# Patient Record
Sex: FEMALE | Race: WHITE | NOT HISPANIC OR LATINO | URBAN - METROPOLITAN AREA
[De-identification: names, ages, dates, MRNs, and addresses within clinical notes are randomized per-mention and may not be internally consistent; named-entity substitution may affect disease eponyms.]

---

## 2017-02-13 ENCOUNTER — OFFICE VISIT - HEALTHEAST (OUTPATIENT)
Dept: FAMILY MEDICINE | Facility: CLINIC | Age: 22
End: 2017-02-13

## 2017-02-13 DIAGNOSIS — Z71.84 COUNSELING FOR TRAVEL: ICD-10-CM

## 2017-02-13 DIAGNOSIS — Z23 NEED FOR VACCINATION: ICD-10-CM

## 2017-02-13 DIAGNOSIS — G43.109 MIGRAINE WITH AURA AND WITHOUT STATUS MIGRAINOSUS: ICD-10-CM

## 2017-02-13 ASSESSMENT — MIFFLIN-ST. JEOR: SCORE: 1463.94

## 2019-01-11 ENCOUNTER — OFFICE VISIT - HEALTHEAST (OUTPATIENT)
Dept: FAMILY MEDICINE | Facility: CLINIC | Age: 24
End: 2019-01-11

## 2019-01-11 DIAGNOSIS — R00.0 TACHYCARDIA: ICD-10-CM

## 2019-01-11 DIAGNOSIS — N92.6 IRREGULAR MENSTRUAL CYCLE: ICD-10-CM

## 2019-01-11 DIAGNOSIS — E66.3 OVERWEIGHT (BMI 25.0-29.9): ICD-10-CM

## 2019-01-11 LAB
ERYTHROCYTE [DISTWIDTH] IN BLOOD BY AUTOMATED COUNT: 13 % (ref 11–14.5)
HCT VFR BLD AUTO: 37.9 % (ref 35–47)
HGB BLD-MCNC: 12.8 G/DL (ref 12–16)
MCH RBC QN AUTO: 28.9 PG (ref 27–34)
MCHC RBC AUTO-ENTMCNC: 33.7 G/DL (ref 32–36)
MCV RBC AUTO: 86 FL (ref 80–100)
PLATELET # BLD AUTO: 317 THOU/UL (ref 140–440)
PMV BLD AUTO: 6.8 FL (ref 7–10)
PROLACTIN SERPL-MCNC: 15.1 NG/ML (ref 0–20)
RBC # BLD AUTO: 4.43 MILL/UL (ref 3.8–5.4)
TSH SERPL DL<=0.005 MIU/L-ACNC: 2.51 UIU/ML (ref 0.3–5)
WBC: 8.4 THOU/UL (ref 4–11)

## 2019-01-11 ASSESSMENT — MIFFLIN-ST. JEOR: SCORE: 1472.56

## 2019-01-15 ENCOUNTER — COMMUNICATION - HEALTHEAST (OUTPATIENT)
Dept: FAMILY MEDICINE | Facility: CLINIC | Age: 24
End: 2019-01-15

## 2019-01-15 DIAGNOSIS — N92.6 IRREGULAR MENSTRUAL CYCLE: ICD-10-CM

## 2019-06-05 ENCOUNTER — OFFICE VISIT - HEALTHEAST (OUTPATIENT)
Dept: FAMILY MEDICINE | Facility: CLINIC | Age: 24
End: 2019-06-05

## 2019-06-05 DIAGNOSIS — N94.6 DYSMENORRHEA: ICD-10-CM

## 2019-06-05 DIAGNOSIS — R00.2 PALPITATIONS: ICD-10-CM

## 2019-06-05 DIAGNOSIS — R00.0 TACHYCARDIA: ICD-10-CM

## 2019-06-05 DIAGNOSIS — Z11.8 SCREENING FOR CHLAMYDIAL DISEASE: ICD-10-CM

## 2019-06-05 DIAGNOSIS — N89.8 VAGINAL DISCHARGE: ICD-10-CM

## 2019-06-05 DIAGNOSIS — Z12.4 CERVICAL CANCER SCREENING: ICD-10-CM

## 2019-06-05 DIAGNOSIS — E66.3 OVERWEIGHT (BMI 25.0-29.9): ICD-10-CM

## 2019-06-05 DIAGNOSIS — N92.1 MENORRHAGIA WITH IRREGULAR CYCLE: ICD-10-CM

## 2019-06-05 DIAGNOSIS — B37.31 YEAST INFECTION OF THE VAGINA: ICD-10-CM

## 2019-06-05 LAB
CLUE CELLS: ABNORMAL
TRICHOMONAS, WET PREP: ABNORMAL
YEAST, WET PREP: ABNORMAL

## 2019-06-06 ENCOUNTER — COMMUNICATION - HEALTHEAST (OUTPATIENT)
Dept: FAMILY MEDICINE | Facility: CLINIC | Age: 24
End: 2019-06-06

## 2019-06-06 DIAGNOSIS — B37.31 YEAST INFECTION OF THE VAGINA: ICD-10-CM

## 2019-06-06 LAB
C TRACH DNA SPEC QL PROBE+SIG AMP: NEGATIVE
N GONORRHOEA DNA SPEC QL NAA+PROBE: NEGATIVE

## 2019-06-07 ENCOUNTER — COMMUNICATION - HEALTHEAST (OUTPATIENT)
Dept: FAMILY MEDICINE | Facility: CLINIC | Age: 24
End: 2019-06-07

## 2019-06-07 LAB
BKR LAB AP ABNORMAL BLEEDING: NORMAL
BKR LAB AP BIRTH CONTROL/HORMONES: NORMAL
BKR LAB AP CERVICAL APPEARANCE: NORMAL
BKR LAB AP GYN ADEQUACY: NORMAL
BKR LAB AP GYN INTERPRETATION: NORMAL
BKR LAB AP HPV REFLEX: NORMAL
BKR LAB AP LMP: NORMAL
BKR LAB AP PATIENT STATUS: NORMAL
BKR LAB AP PREVIOUS ABNORMAL: NORMAL
BKR LAB AP PREVIOUS NORMAL: NORMAL
HIGH RISK?: NO
PATH REPORT.COMMENTS IMP SPEC: NORMAL
RESULT FLAG (HE HISTORICAL CONVERSION): NORMAL

## 2019-06-14 ENCOUNTER — COMMUNICATION - HEALTHEAST (OUTPATIENT)
Dept: FAMILY MEDICINE | Facility: CLINIC | Age: 24
End: 2019-06-14

## 2019-06-28 ENCOUNTER — COMMUNICATION - HEALTHEAST (OUTPATIENT)
Dept: FAMILY MEDICINE | Facility: CLINIC | Age: 24
End: 2019-06-28

## 2019-07-24 ENCOUNTER — HOSPITAL ENCOUNTER (OUTPATIENT)
Dept: CARDIOLOGY | Facility: CLINIC | Age: 24
Discharge: HOME | End: 2019-07-24
Attending: FAMILY MEDICINE

## 2019-07-24 DIAGNOSIS — R00.2 PALPITATIONS: ICD-10-CM

## 2019-07-24 DIAGNOSIS — R00.0 TACHYCARDIA: ICD-10-CM

## 2019-07-25 ENCOUNTER — HOSPITAL ENCOUNTER (OUTPATIENT)
Dept: ULTRASOUND IMAGING | Facility: CLINIC | Age: 24
Discharge: HOME | End: 2019-07-25
Attending: FAMILY MEDICINE

## 2019-07-25 DIAGNOSIS — N94.6 DYSMENORRHEA: ICD-10-CM

## 2019-07-25 DIAGNOSIS — N92.1 MENORRHAGIA WITH IRREGULAR CYCLE: ICD-10-CM

## 2019-07-29 ENCOUNTER — COMMUNICATION - HEALTHEAST (OUTPATIENT)
Dept: FAMILY MEDICINE | Facility: CLINIC | Age: 24
End: 2019-07-29

## 2019-07-29 DIAGNOSIS — N92.6 IRREGULAR MENSTRUAL CYCLE: ICD-10-CM

## 2019-08-06 ENCOUNTER — COMMUNICATION - HEALTHEAST (OUTPATIENT)
Dept: FAMILY MEDICINE | Facility: CLINIC | Age: 24
End: 2019-08-06

## 2019-10-27 ENCOUNTER — COMMUNICATION - HEALTHEAST (OUTPATIENT)
Dept: FAMILY MEDICINE | Facility: CLINIC | Age: 24
End: 2019-10-27

## 2019-10-27 DIAGNOSIS — N92.6 IRREGULAR MENSTRUAL CYCLE: ICD-10-CM

## 2020-01-17 ENCOUNTER — COMMUNICATION - HEALTHEAST (OUTPATIENT)
Dept: FAMILY MEDICINE | Facility: CLINIC | Age: 25
End: 2020-01-17

## 2020-01-17 DIAGNOSIS — N92.6 IRREGULAR MENSTRUAL CYCLE: ICD-10-CM

## 2020-09-15 ENCOUNTER — COMMUNICATION - HEALTHEAST (OUTPATIENT)
Dept: FAMILY MEDICINE | Facility: CLINIC | Age: 25
End: 2020-09-15

## 2020-09-15 ENCOUNTER — OFFICE VISIT - HEALTHEAST (OUTPATIENT)
Dept: FAMILY MEDICINE | Facility: CLINIC | Age: 25
End: 2020-09-15

## 2020-09-15 DIAGNOSIS — G43.109 MIGRAINE WITH AURA AND WITHOUT STATUS MIGRAINOSUS: ICD-10-CM

## 2020-09-22 ENCOUNTER — AMBULATORY - HEALTHEAST (OUTPATIENT)
Dept: FAMILY MEDICINE | Facility: CLINIC | Age: 25
End: 2020-09-22

## 2020-09-22 DIAGNOSIS — Z97.5 IUD (INTRAUTERINE DEVICE) IN PLACE: ICD-10-CM

## 2020-10-07 ENCOUNTER — OFFICE VISIT - HEALTHEAST (OUTPATIENT)
Dept: FAMILY MEDICINE | Facility: CLINIC | Age: 25
End: 2020-10-07

## 2020-10-07 DIAGNOSIS — Z00.00 ROUTINE GENERAL MEDICAL EXAMINATION AT A HEALTH CARE FACILITY: ICD-10-CM

## 2020-10-07 DIAGNOSIS — E66.09 CLASS 1 OBESITY DUE TO EXCESS CALORIES WITHOUT SERIOUS COMORBIDITY WITH BODY MASS INDEX (BMI) OF 31.0 TO 31.9 IN ADULT: ICD-10-CM

## 2020-10-07 DIAGNOSIS — E66.811 CLASS 1 OBESITY DUE TO EXCESS CALORIES WITHOUT SERIOUS COMORBIDITY WITH BODY MASS INDEX (BMI) OF 31.0 TO 31.9 IN ADULT: ICD-10-CM

## 2020-10-07 DIAGNOSIS — Z23 ENCOUNTER FOR ADMINISTRATION OF VACCINE: ICD-10-CM

## 2020-10-07 ASSESSMENT — MIFFLIN-ST. JEOR: SCORE: 1567.35

## 2020-12-26 ENCOUNTER — COMMUNICATION - HEALTHEAST (OUTPATIENT)
Dept: FAMILY MEDICINE | Facility: CLINIC | Age: 25
End: 2020-12-26

## 2020-12-26 DIAGNOSIS — G43.109 MIGRAINE WITH AURA AND WITHOUT STATUS MIGRAINOSUS: ICD-10-CM

## 2021-05-29 NOTE — PATIENT INSTRUCTIONS - HE
Will call with test results    Will check ultrasound and refer to OBGYN    Will check heart monitor for palpitations    Let me know if you are interested in shadowing sometime this summer

## 2021-05-29 NOTE — PROGRESS NOTES
Assessment/Plan:    1. Menorrhagia with irregular cycle  2. Dysmenorrhea  Patient reports some improvement since starting OCPs but overall still with significant symptoms that are hindering her life.  Symptoms suspicious for endometriosis.  Will evaluate further with pelvic ultrasound.  We will also refer to OB/GYN for further evaluation and management given inadequate symptom control with NSAIDs and OCPs.  - Ambulatory referral to Obstetrics / Gynecology  - US Pelvis With Transvaginal Non OB; Future    3. Overweight (BMI 25.0-29.9)  BMI 29.39 today.  Discussed healthy diet and regular exercise for weight loss.    4. Screening for chlamydial disease  Given age and gender, will screen for chlamydia today based on recommendations  - Chlamydia trachomatis & Neisseria gonorrhoeae, Amplified Detection    5. Cervical cancer screening  Due for Pap smear today given age greater than 21, completed today  - Gynecologic Cytology (PAP Smear)    6. Vaginal discharge  9. Yeast infection of the vagina  Vaginal discharge noted on today's exam, wet prep obtained and positive for yeast.  Would recommend treatment with Diflucan 150 mg x 1 dose.  I attempted to send this to patient's pharmacy but currently have [place] pharmacy listed.  MA attempted to call patient but was unable to reach her or leave message, will try again tomorrow.  - Wet Prep, Vaginal    7. Tachycardia  8. Palpitations  At the end of visit patient and mom reporting issues with heart palpitations and fainting.  At her last visit I had concerns that the symptoms were potentially related to anemia though her hemoglobin came back within the normal range.  Given significant symptoms, will evaluate further with heart monitor which she will complete after returning from her trip to [place].  - ML Hook-Up; Future    Follow up: 1 month as needed    Ilda Tracey MD  [facility]    Subjective:    Patient ID: Jazmin Camarillo is a 24 y.o. female is here today  for f/u birth control    F/u birth control  -Patient was initially seen on 1/11/2019 for menstrual problem (irregular menses, painful periods, heavy menstrual flow), started OCPs  -little improvement  -periods aren't lasting as long now and more regular  -still very heavy periods, changing super plus tampons every hour for first few days  -still significant pain with periods, unable to go to class at times  -no hx ultrasounds or prior diagnoses such as endometriosis    Heart palpitations  -feels once every 2 weeks  -fainting typically week before period, last month every morning for a week, sometimes can get herself to the ground without hitting head or fully passing out  -hard to say to palpitations are connected to fainting  -no chest pain or shortness of breath or leg swelling  -mom more worried than pt  -palpitation last for seconds, last episode a few days ago  -leaving for [place] in 2 days      Patient Active Problem List   Diagnosis     Migraine with aura and without status migrainosus     Past Medical History:   Diagnosis Date     Migraine      History reviewed. No pertinent surgical history.  Current Outpatient Medications on File Prior to Visit   Medication Sig Dispense Refill     amitriptyline (ELAVIL) 25 MG tablet Take 1 tablet (25 mg total) by mouth bedtime. 120 tablet 0     desogestrel-ethinyl estradiol (APRI) 0.15-0.03 mg per tablet Take 1 tablet by mouth daily. 84 tablet 1     No current facility-administered medications on file prior to visit.      Allergies   Allergen Reactions     Zithromax [Azithromycin] Rash     Skin rash     Social History     Socioeconomic History     Marital status: Single     Spouse name: Not on file     Number of children: Not on file     Years of education: Not on file     Highest education level: Not on file   Occupational History     Not on file   Social Needs     Financial resource strain: Not on file     Food insecurity:     Worry: Not on file     Inability: Not on  file     Transportation needs:     Medical: Not on file     Non-medical: Not on file   Tobacco Use     Smoking status: Never Smoker     Smokeless tobacco: Never Used   Substance and Sexual Activity     Alcohol use: Yes     Frequency: Monthly or less     Drinks per session: 1 or 2     Binge frequency: Never     Drug use: No     Sexual activity: Never   Lifestyle     Physical activity:     Days per week: Not on file     Minutes per session: Not on file     Stress: Not on file   Relationships     Social connections:     Talks on phone: Not on file     Gets together: Not on file     Attends Rastafari service: Not on file     Active member of club or organization: Not on file     Attends meetings of clubs or organizations: Not on file     Relationship status: Not on file     Intimate partner violence:     Fear of current or ex partner: Not on file     Emotionally abused: Not on file     Physically abused: Not on file     Forced sexual activity: Not on file   Other Topics Concern     Not on file   Social History Narrative     Not on file     Review of systems is as stated in HPI, and the remainder of system review is otherwise negative.    Objective:      /80   Pulse 90   Wt 171 lb 4 oz (77.7 kg)   LMP 05/06/2019   BMI 29.39 kg/m      General appearance: awake, NAD,accompanied by mother  HEENT: atraumatic, normocephalic, no scleral icterus or injection, ears and nose grossly normal, moist mucous membranes  CV: RRR, no murmurs/rubs/gallops, normal S1 and S2  Lungs: CTAB, no wheezes or crackles, breathing comfortably on room air  : normal external female genitalia, moderate white discharge noted, normal appearing cervix  Extremities: no LE edema bilaterally, moving all extremities  Neuro: alert, oriented x3, CNs grossly intact, no focal deficits appreciated  Psych: normal mood/affect/behavior, answering questions appropriately, linear thought process

## 2021-05-29 NOTE — TELEPHONE ENCOUNTER
----- Message from Ilda Tracey MD sent at 6/5/2019  4:50 PM CDT -----  Please call pt with test result.    Bienvenido Wu,    Your vaginal swab came back positive for a yeast infection. Dr Tracey will send an antifungal medication called Diflucan to your pharmacy; only 1 dose of this medication needs to be taken for treatment.    Thanks,  Dr Tracey

## 2021-05-30 VITALS — WEIGHT: 159.6 LBS | HEIGHT: 64 IN | BODY MASS INDEX: 27.25 KG/M2

## 2021-05-30 NOTE — TELEPHONE ENCOUNTER
Reason contacted:  Heart monitor question  Information relayed:  Below message   Additional questions:  No  Further follow-up needed:  No  Okay to leave a detailed message:  No

## 2021-05-30 NOTE — TELEPHONE ENCOUNTER
Who is calling:  Patient  Reason for Call:    Patient states she has had 3 palpitation episodes since 7/24/19 when the heart monitor was put on her by Cardiology.  Patient is questioning if she can take the heart monitor off now instead of having it on for the full 7 days.  Patient states the heart monitor is irritating her skin.  Patient states she is leaving for school on Friday, 8/2/19.  Date of last appointment with primary care: 6/5/19  Okay to leave a detailed message: Yes

## 2021-05-30 NOTE — TELEPHONE ENCOUNTER
Refill Request  Did you contact pharmacy: Yes  Medication name:   Requested Prescriptions     Pending Prescriptions Disp Refills     desogestrel-ethinyl estradiol (APRI) 0.15-0.03 mg per tablet 84 tablet 1     Sig: Take 1 tablet by mouth daily.     Who prescribed the medication:   Ilda Tracey MD    Pharmacy Name and Location:   [id number]  Is patient out of medication: No.  5 days left  Patient notified refills processed in 72 hours:  yes  Okay to leave a detailed message: yes

## 2021-05-31 NOTE — TELEPHONE ENCOUNTER
Left message to call back for: results   Information to relay to patient:    Bienvenido Wu,    I am sorry that you haven't heard the results yet of your heart monitor - I was on vacation the last 1.5 weeks.    Overall your heart monitor looks good. There was no evidence of any usual arrhythmia or irregular heart rhythm - you do have occasional premature beats which seem to correlate with your symptoms. Overall premature beats are not dangerous and the best ways to naturally manage them are: eating regular healthy meals, drinking enough water, adequate sleep. If the premature beats are causing significant symptoms, sometimes we will discuss trying a medication to help with this called a beta-blocker - this helps regulate your heart rate to prevent premature beats and rapid heart rate. I do not feel strongly that this is needed for you unless you are feeling like your symptoms are significant. Certainly we can discuss this in more detail in clinic if desired.    Let me know.    Thanks,  Dr Tracey

## 2021-05-31 NOTE — TELEPHONE ENCOUNTER
Patient Returning Call  Reason for call:  Patient returning call   Information relayed to patient:                Left message to call back for: results   Information to relay to patient:    Bienvenido Wu,    I am sorry that you haven't heard the results yet of your heart monitor - I was on vacation the last 1.5 weeks.    Overall your heart monitor looks good. There was no evidence of any usual arrhythmia or irregular heart rhythm - you do have occasional premature beats which seem to correlate with your symptoms. Overall premature beats are not dangerous and the best ways to naturally manage them are: eating regular healthy meals, drinking enough water, adequate sleep. If the premature beats are causing significant symptoms, sometimes we will discuss trying a medication to help with this called a beta-blocker - this helps regulate your heart rate to prevent premature beats and rapid heart rate. I do not feel strongly that this is needed for you unless you are feeling like your symptoms are significant. Certainly we can discuss this in more detail in clinic if desired.    Let me know.    Thanks,  Dr Tracey                  Patient has additional questions:  No  If YES, what are your questions/concerns:  NONE   Okay to leave a detailed message?: No call back needed

## 2021-05-31 NOTE — TELEPHONE ENCOUNTER
Test Results  Who is calling?:  Patient  Who ordered the test:  Dr. Tracey  Type of test: Other: Cardiology  Date of test:  7/24/19  Where was the test performed:  [facility] for home testing  What are your questions/concerns?:  Patient is asking what are the results of her test. Patient is asking what needs to be done next after Dr. Tracey reviews her results. Patient is back at school in [place]. Please reach out to patient and advise.  Okay to leave a detailed message?:  Yes [phone]

## 2021-06-02 VITALS — BODY MASS INDEX: 27.57 KG/M2 | HEIGHT: 64 IN | WEIGHT: 161.5 LBS

## 2021-06-02 VITALS — BODY MASS INDEX: 29.39 KG/M2 | WEIGHT: 171.25 LBS

## 2021-06-02 NOTE — TELEPHONE ENCOUNTER
Refill Approved    Rx renewed per Medication Renewal Policy. Medication was last renewed on 7/29/19 .    Kiana Jane, [facility] Connection Triage/Med Refill 10/27/2019     Requested Prescriptions   Pending Prescriptions Disp Refills     desogestrel-ethinyl estradiol (APRI) 0.15-0.03 mg per tablet 84 tablet 0     Sig: Take 1 tablet by mouth daily.       Oral Contraceptives Protocol Passed - 10/27/2019  9:31 PM        Passed - Visit with PCP or prescribing provider visit in last 12 months      Last office visit with prescriber/PCP: Visit date not found OR same dept: 6/5/2019 Ilda Tracey MD OR same specialty: 6/5/2019 Ilda Tracey MD  Last physical: Visit date not found Last MTM visit: Visit date not found   Next visit within 3 mo: Visit date not found  Next physical within 3 mo: Visit date not found  Prescriber OR PCP: Shell Rosenberg MD  Last diagnosis associated with med order: 1. Irregular menstrual cycle  - desogestrel-ethinyl estradiol (APRI) 0.15-0.03 mg per tablet; Take 1 tablet by mouth daily.  Dispense: 84 tablet; Refill: 0    If protocol passes may refill for 12 months if within 3 months of last provider visit (or a total of 15 months).

## 2021-06-04 VITALS
WEIGHT: 183.5 LBS | HEIGHT: 64 IN | HEART RATE: 99 BPM | SYSTOLIC BLOOD PRESSURE: 120 MMHG | DIASTOLIC BLOOD PRESSURE: 80 MMHG | BODY MASS INDEX: 31.33 KG/M2

## 2021-06-05 NOTE — TELEPHONE ENCOUNTER
Refill Approved    Rx renewed per Medication Renewal Policy. Medication was last renewed on 10/27/19.    Radhika Elizabeth, Care Connection Triage/Med Refill 1/19/2020     Requested Prescriptions   Pending Prescriptions Disp Refills     desogestrel-ethinyl estradiol (APRI) 0.15-0.03 mg per tablet 84 tablet 0     Sig: Take 1 tablet by mouth daily.       Oral Contraceptives Protocol Passed - 1/17/2020 11:26 PM        Passed - Visit with PCP or prescribing provider visit in last 12 months      Last office visit with prescriber/PCP: 6/5/2019 Ilda Tracey MD OR same dept: 6/5/2019 Ilda Tracey MD OR same specialty: 6/5/2019 Ilda Tracey MD  Last physical: Visit date not found Last MTM visit: Visit date not found   Next visit within 3 mo: Visit date not found  Next physical within 3 mo: Visit date not found  Prescriber OR PCP: Ilda Tracey MD  Last diagnosis associated with med order: 1. Irregular menstrual cycle  - desogestrel-ethinyl estradiol (APRI) 0.15-0.03 mg per tablet; Take 1 tablet by mouth daily.  Dispense: 84 tablet; Refill: 0    If protocol passes may refill for 12 months if within 3 months of last provider visit (or a total of 15 months).

## 2021-06-11 NOTE — PROGRESS NOTES
Assessment:   The encounter diagnosis was Migraine with aura and without status migrainosus.     Plan:     Medications Ordered   Medications     amitriptyline (ELAVIL) 25 MG tablet     Sig: Take 1 tablet (25 mg total) by mouth at bedtime.     Dispense:  30 tablet     Refill:  2     There are no Patient Instructions on file for this visit.  Return for further follow up if needed. Call [phone]) or schedule an appointment via [facility]    Subjective:   Jazmin Camarillo is a 25 y.o. female who submitted an eVisit request for evaluation of her Headache.  See the questionnaire and message section of encounter report for information related to history of present illness and review of systems.    The following portions of the patient's history were reviewed and updated as appropriate:  She does not have any pertinent problems on file.  She is allergic to zithromax [azithromycin]..     Objective:   No exam performed today, patient submitted as eVisit.      Provider E-Visit time total (minutes): 6

## 2021-06-12 NOTE — PROGRESS NOTES
Assessment/Plan:   Jazmin is a 25 year old female here for physical     1. Routine general medical examination at a health care facility  Physical completed today.  Up-to-date with Pap smear.  Vaccines as below.  No labs indicated today.  She has IUD for contraception, reports some irregular bleeding, we discussed trial ibuprofen.  Mom worried about patient's recent fever and hives, we discussed no need for lab work at this time as it is likely to be normal; discussed if hives occur again then would consider referral to allergist.    2. Encounter for administration of vaccine  Due for tetanus booster, administered today.  Patient also due for HPV vaccine series but declines as she had a friend who had a rare cardiac reaction.  - Td, Preservative Free (green label)    3. Class 1 obesity due to excess calories without serious comorbidity with body mass index (BMI) of 31.0 to 31.9 in adult  BMI 31.50 today.  Discussed healthy diet and regular exercise for weight loss.      I have had an Advance Directives discussion with the patient.  The following high BMI interventions were performed this visit: encouragement to exercise and lifestyle education regarding diet    Follow up: 1 year for physical    Ilda Tracey MD  [facility]    Subjective:     Jazmin Camarillo is a 25 y.o. female who presents for an annual exam.     Fever, hives  -pt not worried but mom worried about this issue  -4 weeks ago pt had a fever for 6 days, COVID19 test on 9/10 was negative and then illness went away - lost appetite for a day but no other symptoms  -2 weeks later got hives all over body, thinks is related to new laundry detergent - wasn't responding to benadryl  -went to ER for hives - prednisone worked (feeling much better after 3 days and resolved after 6 days)  -mom wondering about pt getting blood test done to look for infection    Healthy Habits:   Healthy Diet: Yes  Regular Exercise: Yes  Sunscreen Use: Yes  Dental Visits  Regularly: Yes  Seat Belt: Yes  Domestic abuse:   No    Health Maintenance reviewed:  Lipid Profile: no  Glucose Screen: no  Colonoscopy: no  Mammogram: no    Gynecologic History  Patient's last menstrual period was 10/05/2020.  Contraception: IUD  Last Pap: 2019. Results were: nml    Immunization History   Administered Date(s) Administered     DTP / HiB 1997, 1997, 01/13/1998     DTaP / HiB 10/08/1998     Dtap 05/09/2002     Hep B, Peds or Adolescent 1997, 1997, 01/13/1998     Hepatitis A, Ped/Adol 2 Dose IM (18yr & under) 06/29/2009, 08/21/2012     IPV 1997, 1997, 05/09/2002     Influenza,seasonal,quad inj =/> 6months 10/05/2020     MMR 10/08/1998, 09/27/2000     Meningococcal MCV4P 12/09/2014, 08/05/2015     OPV,Trivalent,Historic([id number] only) 10/08/1998     Td, adult adsorbed, PF 10/07/2020     Tdap 06/29/2009     Typhoid, Inj, Inactive 02/13/2017     Varicella 09/27/2000, 06/29/2009     Immunization status: due for vaccines today - HPV (declines as had friend with negative reaction), Td.    Current Outpatient Medications   Medication Sig Dispense Refill     amitriptyline (ELAVIL) 25 MG tablet Take 1 tablet (25 mg total) by mouth at bedtime. 30 tablet 2     levonorgestreL (MIRENA) 20 mcg/24 hours (5 yrs) 52 mg IUD 1 each by Intrauterine route once. Placed 6/22/20       No current facility-administered medications for this visit.      Past Medical History:   Diagnosis Date     Migraine      History reviewed. No pertinent surgical history.  Other environmental allergy and Zithromax [azithromycin]  Family History   Problem Relation Age of Onset     Hypertension Mother      Hypotension Father      No Medical Problems Sister      Hypertension Maternal Grandmother      Hypertension Maternal Grandfather      Breast cancer Paternal Grandmother      Hypertension Paternal Grandmother      No Medical Problems Paternal Grandfather      Social History     Socioeconomic History      Marital status: Single     Spouse name: Not on file     Number of children: Not on file     Years of education: Not on file     Highest education level: Not on file   Occupational History     Not on file   Social Needs     Financial resource strain: Not on file     Food insecurity     Worry: Not on file     Inability: Not on file     Transportation needs     Medical: Not on file     Non-medical: Not on file   Tobacco Use     Smoking status: Never Smoker     Smokeless tobacco: Never Used   Substance and Sexual Activity     Alcohol use: Yes     Frequency: Monthly or less     Drinks per session: 1 or 2     Binge frequency: Never     Drug use: No     Sexual activity: Never   Lifestyle     Physical activity     Days per week: Not on file     Minutes per session: Not on file     Stress: Not on file   Relationships     Social connections     Talks on phone: Not on file     Gets together: Not on file     Attends Restorationism service: Not on file     Active member of club or organization: Not on file     Attends meetings of clubs or organizations: Not on file     Relationship status: Not on file     Intimate partner violence     Fear of current or ex partner: Not on file     Emotionally abused: Not on file     Physically abused: Not on file     Forced sexual activity: Not on file   Other Topics Concern     Not on file   Social History Narrative     Not on file       Review of Systems  General:  Denies problem except obesity  Eyes: Denies problem  Ears/Nose/Throat: Denies problem  Cardiovascular: Denies problem  Respiratory:  Denies problem  Gastrointestinal:  Denies problem   Genitourinary: Denies problem  Musculoskeletal:  Denies problem  Skin: Denies problem  Neurologic: Denies problem  Psychiatric: Denies problem  Endocrine: Denies problem  Heme/Lymphatic: Denies problem   Allergic/Immunologic: Denies problem    Objective:        Vitals:    10/07/20 1135   BP: 120/80   Pulse: 99   Weight: 183 lb 8 oz (83.2 kg)   Height: 5'  "4\" (1.626 m)     Body mass index is 31.5 kg/m .    Physical Exam:  General Appearance: Alert, pleasant, appears stated age, obese, here with mom  Head: Normocephalic, without obvious abnormality  Eyes: PERRL, conjunctiva/corneas clear, EOM's intact  Ears: Normal TM's and external ear canals, both ears  Nose: Nares normal, septum midline,mucosa normal, no drainage  Throat: Lips, mucosa, and tongue normal; teeth and gums normal; oropharynx is clear  Neck: Supple,without lymphadenopathy, no thyromegaly or nodules noted  Lungs: Clear to auscultation bilaterally, respirations unlabored, no wheezing or crackles  Heart: Regular rate and rhythm, no murmur   Abdomen: Soft, non-tender, no masses, no organomegaly  Extremities: Extremities with strong and symmetric pulses, no cyanosis or edema  Skin: Skin color, texture normal, no rashes or lesions - normal appearing mole on L hip/thigh  Neurologic: Grossly normal, no focal deficits    "

## 2021-06-14 NOTE — TELEPHONE ENCOUNTER
Refill Approved    Rx renewed per Medication Renewal Policy. Medication was last renewed on 9/15/20, last OV 10/7/20.    Jeaneth Stovall, Care Connection Triage/Med Refill 12/26/2020     Requested Prescriptions   Pending Prescriptions Disp Refills     amitriptyline (ELAVIL) 25 MG tablet [Pharmacy Med Name: AMITRIPTYLINE HCL 25 MG TAB] 30 tablet 2     Sig: TAKE 1 TABLET BY MOUTH EVERYDAY AT BEDTIME       Tricyclics/Misc Antidepressant/Antianxiety Meds Refill Protocol Passed - 12/26/2020 12:13 AM        Passed - PCP or prescribing provider visit in last year     Last office visit with prescriber/PCP: 6/5/2019 Ilda Tracey MD OR same dept: Visit date not found OR same specialty: 6/5/2019 Ilda Tracey MD  Last physical: 10/7/2020 Last MTM visit: Visit date not found   Next visit within 3 mo: Visit date not found  Next physical within 3 mo: Visit date not found  Prescriber OR PCP: Ilda Tracey MD  Last diagnosis associated with med order: 1. Migraine with aura and without status migrainosus  - amitriptyline (ELAVIL) 25 MG tablet [Pharmacy Med Name: AMITRIPTYLINE HCL 25 MG TAB]; TAKE 1 TABLET BY MOUTH EVERYDAY AT BEDTIME  Dispense: 30 tablet; Refill: 2    If protocol passes may refill for 12 months if within 3 months of last provider visit (or a total of 15 months).

## 2021-06-19 NOTE — LETTER
Letter by Ilda Tracey MD at      Author: Ilda Tracey MD Service: -- Author Type: --    Filed:  Encounter Date: 6/7/2019 Status: (Other)         Jazmin Camarillo  [address] [facility] [address]       June 7, 2019       Dear Ms. Camarillo,    Below are the results from your recent visit:    Resulted Orders   Gynecologic Cytology (PAP Smear)   Result Value Ref Range    Case Report      Interpretation  Negative for squamous intraepithelial lesion or malignancy.      Negative for squamous intraepithelial lesion or malignancy    Result Flag Normal Normal   Chlamydia trachomatis & Neisseria gonorrhoeae, Amplified Detection   Result Value Ref Range    Chlamydia trachomatis, Amplified Detection Negative Negative    Neisseria gonorrhoeae, Amplified Detection Negative Negative   Wet Prep, Vaginal   Result Value Ref Range    Yeast Result Yeast Seen (!) No yeast seen    Trichomonas No Trichomonas seen No Trichomonas seen    Clue Cells, Wet Prep No Clue cells seen No Clue cells seen     Your pap smear came back normal - next pap smear will be in 3 years.  You tested negative for gonorrhea and chlamydia as we expected.  You tested positive for a yeast infection - I hope you were able to get the medication from your pharmacy.    Please call with questions or contact us using [employer].    Sincerely,        Ilda Tracey MD

## 2021-06-19 NOTE — LETTER
Letter by Ilda Tracey MD at      Author: Ilda Tracey MD Service: -- Author Type: --    Filed:  Encounter Date: 6/14/2019 Status: (Other)        [facility] FAMILY MEDICINE/OB  [address]  [facility] [address]  [phone]         Jazmin Camarillo  [address] [facility] [address]        06/14/19    Dear Jazmin Camarillo,     At [facility] we care about your health and well-being. Your primary care provider is committed to ensuring you receive high quality care and has chosen a network of specialists to assist in providing that care. Recently Dr Ilda Tracey referred you to [facility] for specialty care.       It is important to your overall health to follow through with the recommendation from your provider. Please call [facility] ([phone]) at your earliest convenience for assistance in scheduling an appointment. If you have already scheduled this appointment, please disregard this notice. Thank you for choosing [facility] for your healthcare needs.       Sincerely,       [facility] Specialty Scheduling   Dr Ilda Tracey

## 2021-06-19 NOTE — LETTER
Letter by Ilda Tracey MD at      Author: Ilda Tracey MD Service: -- Author Type: --    Filed:  Encounter Date: 6/28/2019 Status: (Other)        [facility] FAMILY MEDICINE/OB  [address]  [facility] [address]  [phone]         Jazmin Camarillo  [address] [facility] [address]        06/28/19    Dear Jazmin Camarillo,     At [facility] we care about your health and well-being. Your primary care provider is committed to ensuring you receive high quality care and has chosen a network of specialists to assist in providing that care. Recently Dr. Ilda Tracey referred you to [facility] for an Ultrasound. They have made several attempts to connect with you to assist with scheduling, however they have been unable to reach you by phone.       It is important to your overall health to follow through with the recommendation from your provider. Please call [facility] ([phone]) at your earliest convenience for assistance in scheduling an appointment.  If you have already scheduled this appointment, please disregard this notice.  Thank you for choosing [facility] for your healthcare needs.       Sincerely,     Dr. Ilda Tracey /   [facility] Specialty Scheduling

## 2021-06-23 NOTE — TELEPHONE ENCOUNTER
Spoke with patient to discuss test results.  Normal prolactin, TSH and CBC levels.  Will start OCPs for menstrual regulation.  Patient wishes to start low progesterone OCP given family history of mood changes that they clearly associated with the progesterone, prescription sent to pharmacy and [place].    Dr Tracey

## 2021-06-23 NOTE — TELEPHONE ENCOUNTER
Test Results  Who is calling?:  Patient   Who ordered the test: Ilda Tracey MD    Type of test: Lab  Date of test:  1/11/19  Where was the test performed:  [facility]   What are your questions/concerns?:  results  Okay to leave a detailed message?:  Yes

## 2021-06-23 NOTE — PROGRESS NOTES
Assessment/Plan:    1. Irregular menstrual cycle  Chronically irregular menstrual cycle.  No concern for pregnancy given patient has never been sexually active.  Will check TSH and prolactin for possible etiologies.  If these are both normal, would recommend starting OCPs.  Will call patient with test results and consent OCPs to her pharmacy in [place].  No contraindications to OCPs based our discussion today.  Mom does have PCOS, patient does not have other signs associated with PCOS but will continue to have this on the differential.  - HM2(CBC w/o Differential)  - Thyroid Stimulating Hormone (TSH)  - Prolactin    2. Overweight (BMI 25.0-29.9)  BMI 27.72 today    3.  Tachycardia  Patient mildly tachycardic upon rooming, improved somewhat during our exam.  Patient also notes fainting during periods.  Concern for anemia, CBC checked today.      Follow up: Pending lab results    Ilda Tracey MD  [facility]    Subjective:    Patient ID: Jazmin Camarillo is a 23 y.o. female is here today for evaluation of irregular menstrual cycle    Irregular menstrual cycle  -always really irregular, maybe period twice/year but this past semester 4 times in 2 months  -period in October and then started a few days ago  -horrible cramps  -pretty heavy flow during period  -duration 7+ days, day 8-9 is lighter but otherwise heavy every other day (changing pad every hour)  -not sexually active  -clinic in [place] - recommended OCPs but pt declined  -uses midol during periods and heating pad  -feels faint during period, especially first day and maybe 2-3 days more; hx multiple fainting episodes  -one maternal aunt with thyroid disorder  -hx migraines, good on amitriptyline - no other headache or vision changes  -mom with PCOS  -family hx issues with mood related to progesterone - mom requests considering low dose progesterone  -Currently in undergrad, premed    Patient Active Problem List   Diagnosis     Migraine with  "aura and without status migrainosus     Past Medical History:   Diagnosis Date     Migraine      History reviewed. No pertinent surgical history.     Current Outpatient Medications on File Prior to Visit   Medication Sig Dispense Refill     amitriptyline (ELAVIL) 25 MG tablet Take 1 tablet (25 mg total) by mouth bedtime. 120 tablet 0     No current facility-administered medications on file prior to visit.      Allergies   Allergen Reactions     Zithromax [Azithromycin] Rash     Skin rash     Social History     Socioeconomic History     Marital status: Single     Spouse name: Not on file     Number of children: Not on file     Years of education: Not on file     Highest education level: Not on file   Social Needs     Financial resource strain: Not on file     Food insecurity - worry: Not on file     Food insecurity - inability: Not on file     Transportation needs - medical: Not on file     Transportation needs - non-medical: Not on file   Occupational History     Not on file   Tobacco Use     Smoking status: Never Smoker     Smokeless tobacco: Never Used   Substance and Sexual Activity     Alcohol use: Yes     Frequency: Monthly or less     Drinks per session: 1 or 2     Binge frequency: Never     Drug use: No     Sexual activity: No   Other Topics Concern     Not on file   Social History Narrative     Not on file     Family History   Problem Relation Age of Onset     Hypertension Mother      Hypotension Father      No Medical Problems Sister      Hypertension Maternal Grandmother      Hypertension Maternal Grandfather      Breast cancer Paternal Grandmother      Hypertension Paternal Grandmother      No Medical Problems Paternal Grandfather      Review of systems is as stated in HPI, and the remainder of the 10 system review is otherwise negative.    Objective:      /60   Pulse (!) 105   Ht 5' 4\" (1.626 m)   Wt 161 lb 8 oz (73.3 kg)   LMP 01/10/2019   BMI 27.72 kg/m      General appearance: awake, NAD, " accompanied by mother  HEENT: atraumatic, normocephalic, no scleral icterus or injection, moist mucous membranes  Neck: normal ROM  CV: RRR, no murmurs/rubs/gallops, normal S1 and S2  Lungs: CTAB, no wheezes or crackles, breathing comfortably on room air  Abd: active bowel sounds, soft, non-distended, mildly tender in low abdomen from./Cramps  Extremities: moving all extremities  Skin: No significant pallor noted, mom thinks patient is more pale  Neuro: alert, oriented x3, CNs grossly intact, no focal deficits appreciated  Psych: normal mood/affect/behavior, answering questions appropriately, linear thought process

## 2021-06-23 NOTE — PATIENT INSTRUCTIONS - HE
Check blood tests today - call with results    Set up MyChart    Will discuss birth control pills and other meds over the phone    Get a pap at some point

## 2021-12-16 PROBLEM — N94.6 DYSMENORRHEA: Status: ACTIVE | Noted: 2019-07-26

## 2021-12-16 PROBLEM — E28.2 PCOS (POLYCYSTIC OVARIAN SYNDROME): Status: ACTIVE | Noted: 2020-10-07

## 2021-12-16 PROBLEM — G43.109 MIGRAINE WITH AURA AND WITHOUT STATUS MIGRAINOSUS: Status: ACTIVE | Noted: 2017-02-13

## 2021-12-16 PROBLEM — N80.9 ENDOMETRIOSIS: Status: ACTIVE | Noted: 2020-10-07

## 2021-12-16 PROBLEM — Z97.5 IUD (INTRAUTERINE DEVICE) IN PLACE: Status: ACTIVE | Noted: 2020-09-22

## 2021-12-17 ENCOUNTER — OFFICE VISIT (OUTPATIENT)
Dept: FAMILY MEDICINE | Facility: CLINIC | Age: 26
End: 2021-12-17
Payer: COMMERCIAL

## 2021-12-17 VITALS
WEIGHT: 186.3 LBS | SYSTOLIC BLOOD PRESSURE: 116 MMHG | DIASTOLIC BLOOD PRESSURE: 68 MMHG | HEIGHT: 66 IN | BODY MASS INDEX: 29.94 KG/M2

## 2021-12-17 DIAGNOSIS — Z00.00 ROUTINE ADULT HEALTH MAINTENANCE: Primary | ICD-10-CM

## 2021-12-17 DIAGNOSIS — Z02.89 ENCOUNTER FOR COMPLETION OF FORM WITH PATIENT: ICD-10-CM

## 2021-12-17 PROCEDURE — 99395 PREV VISIT EST AGE 18-39: CPT | Performed by: FAMILY MEDICINE

## 2021-12-17 ASSESSMENT — MIFFLIN-ST. JEOR: SCORE: 1615.93

## 2021-12-17 NOTE — PROGRESS NOTES
Assessment/Plan:   Jazmin is a 26-year-old female here for physical without additional concerns.    Routine adult health maintenance  Physical completed today.  Up-to-date with Pap smear.  No labs indicated.  Patient needs flu shot and Covid booster but states will get at work/school.    Encounter for completion of form with patient  Form completed for PA school.       I have had an Advance Directives discussion with the patient.  The following high BMI interventions were performed this visit: encouragement to exercise and lifestyle education regarding diet    Follow up: 1 year for physical, sooner as needed    Ilda Tracey MD  [facility]      Subjective:     Jazmin Camarillo is a 26 year old female who presents for an annual exam.     Answers for HPI/ROS submitted by the patient on 12/17/2021  Frequency of exercise:: 2-3 days/week  Getting at least 3 servings of Calcium per day:: Yes  Diet:: Carbohydrate counting  Taking medications regularly:: Yes  Medication side effects:: None  Bi-annual eye exam:: Yes  Dental care twice a year:: Yes  Sleep apnea or symptoms of sleep apnea:: None  Additional concerns today:: No  Duration of exercise:: 30-45 minutes    [facility] [place] [facility]    Health Maintenance reviewed:  Lipid Profile: no  Glucose Screen: no  Colonoscopy: no  Mammogram: no    Gynecologic History  Contraception: IUD Mirena  Last Pap: 2019. Results were: nml    Immunization History   Administered Date(s) Administered     COVID-19,PF,Moderna 02/03/2021, 03/03/2021     DTAP (<7y) 1997, 1997, 01/13/1998, 10/08/1998, 05/09/2002     HEPA 06/29/2009, 08/21/2012     HepB 1997, 1997, 01/13/1998     Hib (PRP-T) 1997, 1997, 01/13/1998, 10/08/1998     Influenza Vaccine, 6+MO IM (QUADRIVALENT W/PRESERVATIVES) 10/05/2020     MMR 10/08/1998, 09/27/2000     Meningococcal (Menactra ) 12/09/2014, 08/05/2015     Poliovirus, inactivated (IPV) 1997, 1997,  10/08/1998, 05/09/2002     TD (ADULT, 7+) 10/07/2020     TDAP Vaccine (Boostrix) 06/29/2009     Typhoid IM 02/13/2017     Varicella 09/27/2000, 06/29/2009     Immunization status: getting flu and covid at school/work.    Current Outpatient Medications   Medication Sig Dispense Refill     amitriptyline (ELAVIL) 25 MG tablet Take 1 tablet by mouth At Bedtime       levonorgestrel (MIRENA) 20 MCG/24HR IUD 1 each by Intrauterine route continuous       Past Medical History:   Diagnosis Date     Migraine      Past Surgical History:   Procedure Laterality Date     NO HISTORY OF SURGERY       Azithromycin and Zithromax z-miley [azithromycin]  Family History   Problem Relation Age of Onset     Neurologic Disorder Mother         migraine headaches in mother and maternal relatives     Asthma Father         exercise induced asthma     Cancer Maternal Aunt         uterine - age 40's     Hypertension Maternal Aunt      Lipids Maternal Grandfather      Diabetes Maternal Grandmother         type 2     Breast Cancer Paternal Grandmother      Hypertension Paternal Grandmother      Gastrointestinal Disease Paternal Grandmother         IBS (irritable bowel sydrome)     Gynecology Mother         kidney stones (calcium)     Hypertension Mother      Hypotension Father      No Known Problems Sister      Hypertension Maternal Grandmother      Hypertension Maternal Grandfather      No Known Problems Paternal Grandfather      Social History     Socioeconomic History     Marital status: Single     Spouse name: Not on file     Number of children: Not on file     Years of education: Not on file     Highest education level: Not on file   Occupational History     Not on file   Tobacco Use     Smoking status: Never Smoker     Smokeless tobacco: Never Used   Substance and Sexual Activity     Alcohol use: Yes     Drug use: No     Sexual activity: Never   Other Topics Concern     Not on file   Social History Narrative     Not on file     Social  "Determinants of Health     Financial Resource Strain: Not on file   Food Insecurity: Not on file   Transportation Needs: Not on file   Physical Activity: Not on file   Stress: Not on file   Social Connections: Not on file   Intimate Partner Violence: Not on file   Housing Stability: Not on file       Review of Systems negative unless noted    Objective:        Vitals:    12/17/21 1245   BP: 116/68   Weight: 84.5 kg (186 lb 4.8 oz)   Height: 1.683 m (5' 6.26\")     Body mass index is 29.83 kg/m .    Physical Exam:  General Appearance: Alert, pleasant, appears stated age  Head: Normocephalic, without obvious abnormality  Eyes: PERRL, conjunctiva/corneas clear, EOM's intact  Ears: Normal TM's and external ear canals, both ears  Neck: Supple,without lymphadenopathy, no thyromegaly or nodules noted  Lungs: Clear to auscultation bilaterally, respirations unlabored, no wheezing or crackles  Heart: Regular rate and rhythm, no murmur   Abdomen: Soft, non-tender, no masses, no organomegaly  Extremities: Extremities with strong and symmetric pulses, no cyanosis or edema  Skin: Skin color, texture normal, no rashes or lesions  Neurologic: Grossly normal, no focal deficits    "

## 2022-02-23 DIAGNOSIS — G43.109 MIGRAINE WITH AURA AND WITHOUT STATUS MIGRAINOSUS, NOT INTRACTABLE: Primary | ICD-10-CM

## 2022-02-24 NOTE — TELEPHONE ENCOUNTER
"Routing refill request to provider for review/approval because:  A break in medication  Due to medication information not transferring due to [facility] please review the medication information prior to signing to ensure accuracy.    Last Written Prescription:      Last office visit provider:  12/17/21    Requested Prescriptions   Pending Prescriptions Disp Refills     amitriptyline (ELAVIL) 25 MG tablet [Pharmacy Med Name: AMITRIPTYLINE HCL 25 MG TAB] 90 tablet 2     Sig: TAKE 1 TABLET BY MOUTH EVERYDAY AT BEDTIME       Tricyclic Agents ( Annual appt and no PHQ9) Passed - 2/23/2022 12:16 PM        Passed - Blood Pressure under 140/90 in past 12 mos     BP Readings from Last 3 Encounters:   12/17/21 116/68   10/07/20 120/80   08/21/12 90/60 (4 %/ 32 %)*     *BP percentiles are based on the 2017 AAP Clinical Practice Guideline for girls                 Passed - Recent (12 mo) or future (30 days) visit within authorizing provider's specialty     Patient has had an office visit with the authorizing provider or a provider within the authorizing providers department within the previous 12 mos or has a future within next 30 days. See \"Patient Info\" tab in inbasket, or \"Choose Columns\" in Meds & Orders section of the refill encounter.              Passed - Medication is active on med list        Passed - Patient is age 18 or older        Passed - Patient is not pregnant        Passed - No positive pregnancy test on record in past 12 mos             Morenita Magaña RN 02/24/22 12:39 AM  "

## 2022-05-27 ENCOUNTER — OFFICE VISIT (OUTPATIENT)
Dept: FAMILY MEDICINE | Facility: CLINIC | Age: 27
End: 2022-05-27
Payer: COMMERCIAL

## 2022-05-27 ENCOUNTER — NURSE TRIAGE (OUTPATIENT)
Dept: NURSING | Facility: CLINIC | Age: 27
End: 2022-05-27

## 2022-05-27 VITALS
OXYGEN SATURATION: 98 % | HEIGHT: 66 IN | HEART RATE: 90 BPM | BODY MASS INDEX: 30.63 KG/M2 | WEIGHT: 190.6 LBS | SYSTOLIC BLOOD PRESSURE: 114 MMHG | DIASTOLIC BLOOD PRESSURE: 68 MMHG

## 2022-05-27 DIAGNOSIS — R30.0 DYSURIA: ICD-10-CM

## 2022-05-27 DIAGNOSIS — R35.0 URINARY FREQUENCY: Primary | ICD-10-CM

## 2022-05-27 PROBLEM — N92.6 IRREGULAR PERIODS: Status: ACTIVE | Noted: 2019-07-26

## 2022-05-27 LAB
BACTERIA #/AREA URNS HPF: NORMAL /HPF
CLUE CELLS: ABNORMAL
RBC #/AREA URNS AUTO: NORMAL /HPF
TRICHOMONAS, WET PREP: ABNORMAL
WBC #/AREA URNS AUTO: NORMAL /HPF
WBC'S/HIGH POWER FIELD, WET PREP: ABNORMAL
YEAST, WET PREP: ABNORMAL

## 2022-05-27 PROCEDURE — 87088 URINE BACTERIA CULTURE: CPT | Performed by: PEDIATRICS

## 2022-05-27 PROCEDURE — 87210 SMEAR WET MOUNT SALINE/INK: CPT | Mod: QW | Performed by: PEDIATRICS

## 2022-05-27 PROCEDURE — 99213 OFFICE O/P EST LOW 20 MIN: CPT | Performed by: PEDIATRICS

## 2022-05-27 PROCEDURE — 81015 MICROSCOPIC EXAM OF URINE: CPT | Performed by: PEDIATRICS

## 2022-05-27 PROCEDURE — 87491 CHLMYD TRACH DNA AMP PROBE: CPT | Performed by: PEDIATRICS

## 2022-05-27 PROCEDURE — 87086 URINE CULTURE/COLONY COUNT: CPT | Performed by: PEDIATRICS

## 2022-05-27 RX ORDER — PHENAZOPYRIDINE HYDROCHLORIDE 200 MG/1
TABLET, FILM COATED ORAL
COMMUNITY
Start: 2022-05-17 | End: 2022-06-03

## 2022-05-27 RX ORDER — CIPROFLOXACIN 500 MG/1
500 TABLET, FILM COATED ORAL 2 TIMES DAILY
Qty: 14 TABLET | Refills: 0 | Status: SHIPPED | OUTPATIENT
Start: 2022-05-27 | End: 2022-06-02

## 2022-05-27 NOTE — TELEPHONE ENCOUNTER
"Patient calling -says she has 8 UTI's in less than 4 months and about 14 UTI's in the past 2 years. Says she woke up with symptoms of a UTI again this morning.  Last UTI was less than 2 weeks ago.  She finished the antibiotics Sunday.  Last two times she was seen in Urgent Care because she couldn't get in with Dr. Tracey soon enough.      She did schedule a follow up to connect with Dr. Tracey but the soonest she could get in is June 17th.    Symptoms this morning include urinary frequency and urgency.  She says she also feels like she is not emptying her bladder completely.  She denies burning or pain with urination but says she can feel that it is likely going to start soon.  She says she drinks a lot of fluids, takes probiotics, urinates after intercourse and \"tries to do all the right things\" but still is having issues with recurrent UTI's.    No fever.    Triaged to disposition of See Today in Office.  Patient says she spoke with scheduling and they had no appointments available.  Patient says she would really like to avoid going to Urgent Care again and would like to connect with her PCP about this recurring problem.  Patient requesting message to PCP asking to be added to schedule for today.  Please advise.    Ryann Fernandes RN  Triage Nurse Advisor      Reason for Disposition    Urinating more frequently than usual (i.e., frequency)    Additional Information    Negative: Sounds like a life-threatening emergency to the triager    Negative: Shock suspected (e.g., cold/pale/clammy skin, too weak to stand, low BP, rapid pulse)    Negative: Followed a genital area injury    Negative: Followed a genital area injury (penis, scrotum)    Negative: Vaginal discharge    Negative: Pus (white, yellow) or bloody discharge from end of penis    Negative: Discomfort (pain, burning or stinging) when passing urine and pregnant    Negative: Discomfort (pain, burning or stinging) when passing urine and female    Negative: " Discomfort (pain, burning or stinging) when passing urine and male    Negative: Pain or itching in the vulvar area    Negative: Pain in scrotum is main symptom    Negative: Blood in the urine is main symptom    Negative: Symptoms arising from use of a urinary catheter ([place] or [place])    Negative: Unable to urinate (or only a few drops) > 4 hours and bladder feels very full (e.g., palpable bladder or strong urge to urinate)    Negative: Decreased urination and drinking very little and dehydration suspected (e.g., dark urine, no urine > 12 hours, very dry mouth, very lightheaded)    Negative: Patient sounds very sick or weak to the triager    Negative: Fever > 100.4 F (38.0 C)    Negative: Side (flank) or lower back pain present    Negative: Can't control passage of urine (i.e., urinary incontinence) and new onset (< 2 weeks) or worsening    Protocols used: URINARY SYMPTOMS-A-[place]

## 2022-05-27 NOTE — TELEPHONE ENCOUNTER
Provider Response to 2nd Level Triage Request    Per - I have reviewed the RN documentation. My recommendation is:  Keep appointment in [place] for evaluation.

## 2022-05-27 NOTE — PROGRESS NOTES
Assessment & Plan     (R35.0) Urinary frequency  (primary encounter diagnosis)    (R30.0) Dysuria      Plan:    Will treat empirically with ciprofloxacin 500 mg twice daily x7 days for presumed UTI given clinical history.  I sent a urine culture and will let patient know when available.  Will screen for chlamydia with a urine test today.  Wet prep was only significant for a few white blood cells which could be related to her current menstrual cycle.  Discussed if symptoms resolve but returned again even prior to her visit with her primary care doctor it would be very important for her to not take AZO tabs and come in to clinic right away for a UA and urine culture.  She has yet to have a urine culture despite multiple years of presumed urinary tract infections.  Since her primary care doctor is in the [facility], a [facility] clinic would be optimal for documentation purposes.  Return to clinic for further evaluation should she develop nausea, vomiting, fever or flank pain.    Brenda Fields MD on 5/27/2022 at 4:21 PM    For billing purposes only: I spent 33 minutes on the date of the encounter during chart review, history and exam, documentation and further activities as noted above.    Rodriguez Wu is a 26 year old who presents for the following health issues   UTI    History of Present Illness       Reason for visit:  UTI  Symptom onset:  More than a month  Symptoms include:  Bladder urgency / frequency  Symptom intensity:  Moderate  Symptom progression:  Worsening  Had these symptoms before:  Yes  Has tried/received treatment for these symptoms:  Yes  Previous treatment was successful:  No  What makes it worse:  Voiding  What makes it better:  Cranberry juice / pyridium / antibiotics (temporarily)    She eats 2-3 servings of fruits and vegetables daily.She consumes 1 sweetened beverage(s) daily.She exercises with enough effort to increase her heart rate 30 to 60 minutes per day.  She exercises with  enough effort to increase her heart rate 3 or less days per week. She is missing 1 dose(s) of medications per week.  She is not taking prescribed medications regularly due to remembering to take.       Genitourinary - Female  Onset/Duration:   Description:   Painful urination (Dysuria): no           Frequency: YES  Blood in urine (Hematuria): no  Delay in urine (Hesitency): no  Intensity:   Progression of Symptoms:  worsening  Accompanying Signs & Symptoms:  Fever/chills: no  Flank pain: YES  Nausea and vomiting: no  Vaginal symptoms: on menstrual cycle  Abdominal/Pelvic Pain: no  History:   History of frequent UTI s: YES  History of kidney stones: no  Sexually Active: YES  Possibility of pregnancy: No  Precipitating or alleviating factors: None  Therapies tried and outcome: Pyridium  and  cranberry juice     26-year-old new patient to me here today for chronic UTIs.    Symptoms first started when she was in high school.  Would have approximately 1-2 urinary tract infections per year.  Has an uncle who works in an ER at [employer] and would just send her antibiotics.  Has had approximately 8 UTIs total in the past 4 months.  All of which have been treated with Macrobid.  First was in December, and then in January he was seen in urgent care and given 2 rounds of Macrobid in case symptoms returned.  She was treated for 5 days and then had another 1 in February and self treated for an additional 5 days.  This past March she got .  While she was on her honeymoon in the [place] she developed symptoms.  She purchased a larger amount of Macrobid and has been using this since that time off and on in 3 to 5-day increments for UTI symptoms.    Most recent bout started on Tuesday of last week.  Had some mild discomfort with urination along with urgency and frequency.  Was seen in urgent care and placed on 5 days of Macrobid.  Finished this on Sunday and then this past Tuesday she woke up in the morning with severe  "pain with urination which she has not experienced in the past.  Has been treating at home with some cranberry juice and AZO tablets.  Symptoms seem to be worsening.  She has had no fever, vomiting or significant flank pain.    , 1 sexual partner in lifetime.  Never been tested for chlamydia but open to this.  Has IUD.  Not worried about pregnancy.  Is currently on her menstrual cycle.    Is planning to see her primary care doctor in a few weeks.  Was unable to get in with her today.    Went to college in [place].  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      Objective    /68 (BP Location: Right arm)   Pulse 90   Ht 1.683 m (5' 6.25\")   Wt 86.5 kg (190 lb 9.6 oz)   LMP 05/19/2022   SpO2 98%   BMI 30.53 kg/m    Body mass index is 30.53 kg/m .     Physical Exam   General:  Alert and oriented, No acute distress.  Obvious discomfort when I enter the room.  Respiratory:  Lungs clear to auscultation bilaterally.  Equal air entry.  Symmetrical chest expansion.  No wheezing.    Cardiovascular:  S1 and S2 with regular rate and rhythm.  No murmurs.  Pulses 2+ in all four extremities.  Brisk capillary refill.   Gastrointestinal:  Positive bowel sounds in all four quadrants.  Abdomen is soft, non-distended.  Mild tenderness in suprapubic area.  No hepatosplenomegaly.    : No CVA tenderness bilaterally.  Integumentary:  No rash.    Neurologic:  No focal deficits.     Latest Reference Range & Units 05/27/22 11:58 05/27/22 12:30   WBC Urine 0-5 /HPF /HPF 0-5    RBC Urine 0-2 /HPF /HPF None Seen    Bacteria Urine None Seen /HPF None Seen    Clue cells Absent   Absent     Wet prep significant for 2+ WBC          "

## 2022-05-28 LAB
BACTERIA UR CULT: ABNORMAL
BACTERIA UR CULT: ABNORMAL
C TRACH DNA SPEC QL NAA+PROBE: NEGATIVE

## 2022-05-30 DIAGNOSIS — R35.0 URINARY FREQUENCY: Primary | ICD-10-CM

## 2022-05-30 RX ORDER — AMOXICILLIN 500 MG/1
500 CAPSULE ORAL 3 TIMES DAILY
Qty: 21 CAPSULE | Refills: 0 | Status: SHIPPED | OUTPATIENT
Start: 2022-05-30 | End: 2022-06-06

## 2022-05-31 ENCOUNTER — TELEPHONE (OUTPATIENT)
Dept: FAMILY MEDICINE | Facility: CLINIC | Age: 27
End: 2022-05-31
Payer: COMMERCIAL

## 2022-05-31 NOTE — TELEPHONE ENCOUNTER
----- Message from Brenda Fields MD sent at 5/30/2022  9:39 AM CDT -----  Please call patient- her urine is positive for group B strep, which will NOT be covered by the antibiotic prescribed.  I sent a new Rx for amoxicillin.  Her Chlamydia test was negative

## 2022-06-02 NOTE — PROGRESS NOTES
Assessment/Plan:    Recurrent UTI  Pt having issues with recurrent UTI - suspect antibiotics were not fully clearing infection; she is feeling well now. Discussed plan to have pt come to clinic for UA/UC when symptoms occur so we can get some more data on what is going on. Will consider renal US in future if needed.   - UA Macro with Reflex to Micro and Culture - lab collect  - Urine Culture Aerobic Bacterial - lab collect    Migraine with aura and without status migrainosus, not intractable  Pt reports some worsening of migraines recently - taking amitriptyline for ppx, using excedrin on occasion as needed. Discussed possible next steps including: increasing amitriptyline dose, using imitrex as needed, possible neurology referral.   - SUMAtriptan (IMITREX) 50 MG tablet  Dispense: 12 tablet; Refill: 1    PCOS (polycystic ovarian syndrome)  Acne vulgaris  Hx PCOS and pt having issues with cystic acne (mostly on chin, some worsening with period/cycle when gets it - has IUD) - will start treatment with retin-a and spironolactone as below - pt will send cottonTracks message in 1 month for follow-up and could increase spironolactone dose if needed.  - tretinoin (RETIN-A) 0.05 % external cream  Dispense: 45 g; Refill: 1  - spironolactone (ALDACTONE) 25 MG tablet  Dispense: 30 tablet; Refill: 1       Follow up: as needed for urinary symptoms, 1 month for acne check in    Ilda Tracey MD  [facility]    Subjective:   Jazmin Camarillo is a 26 year old female is here today for urinary issues    -5/17 urgent care - macrobid  -5/27 - urine culture GBS - amoxicillin  -per note on 5/27, pt reports 8 UTIs in past 4 months - all tx with macrobid; UTIs since HS 1-2/year  -more frequently over past 6-12 months but not necessarily worrisome to her - on mita was in [place] and didn't have medication with her, did an online thing and got macrobid again (she thinks) - had another one 2 weeks later - uncles would send in  antibx, take for 3-4 days and then feel better or was taking [place]oxicillin [place]  -often with pain/dysuria, frequency  -using probiotics, peeing before/after sex, cranberry juice  -not triggered by sex   -feeling well today    -migraines  -no longer gluten free but wonders if that was helpful in the past  -on amitriptyline   -excedrin helps too    -cystic acne - hx PCOS      Patient Active Problem List   Diagnosis     PCOS (polycystic ovarian syndrome)     Migraine with aura and without status migrainosus     IUD (intrauterine device) in place     Endometriosis     Dysmenorrhea     Irregular periods     Past Medical History:   Diagnosis Date     Migraine      Past Surgical History:   Procedure Laterality Date     NO HISTORY OF SURGERY       Current Outpatient Medications   Medication     amitriptyline (ELAVIL) 25 MG tablet     amoxicillin (AMOXIL) 500 MG capsule     levonorgestrel (MIRENA) 20 MCG/24HR IUD     spironolactone (ALDACTONE) 25 MG tablet     SUMAtriptan (IMITREX) 50 MG tablet     tretinoin (RETIN-A) 0.05 % external cream     No current facility-administered medications for this visit.     Allergies   Allergen Reactions     Azithromycin Hives     Other Environmental Allergy      Zithromax Z-Richard [Azithromycin] Hives     Social History     Socioeconomic History     Marital status: Single     Spouse name: Not on file     Number of children: Not on file     Years of education: Not on file     Highest education level: Not on file   Occupational History     Not on file   Tobacco Use     Smoking status: Never Smoker     Smokeless tobacco: Never Used   Substance and Sexual Activity     Alcohol use: Yes     Drug use: No     Sexual activity: Never   Other Topics Concern     Not on file   Social History Narrative     Not on file     Social Determinants of Health     Financial Resource Strain: Not on file   Food Insecurity: Not on file   Transportation Needs: Not on file   Physical Activity: Not on  file   Stress: Not on file   Social Connections: Not on file   Intimate Partner Violence: Not on file   Housing Stability: Not on file     Family History   Problem Relation Age of Onset     Neurologic Disorder Mother         migraine headaches in mother and maternal relatives     Asthma Father         exercise induced asthma     Cancer Maternal Aunt         uterine - age 40's     Hypertension Maternal Aunt      Lipids Maternal Grandfather      Diabetes Maternal Grandmother         type 2     Breast Cancer Paternal Grandmother      Hypertension Paternal Grandmother      Gastrointestinal Disease Paternal Grandmother         IBS (irritable bowel sydrome)     Gynecology Mother         kidney stones (calcium)     Hypertension Mother      Hypotension Father      No Known Problems Sister      Hypertension Maternal Grandmother      Hypertension Maternal Grandfather      No Known Problems Paternal Grandfather      Review of systems is as stated in HPI, and the remainder of system review is otherwise negative.    Objective:     /70   Pulse 86   Wt 85.1 kg (187 lb 9 oz)   LMP 05/19/2022   BMI 30.05 kg/m      General appearance: awake, NAD, here with mom  HEENT: atraumatic, normocephalic, no scleral icterus or injection  Lungs: breathing comfortably on room air  Extremities: moving all extremities  Neuro: alert, oriented x3, CNs grossly intact, no focal deficits appreciated  Psych: normal mood/affect/behavior, answering questions appropriately, linear thought process

## 2022-06-03 ENCOUNTER — OFFICE VISIT (OUTPATIENT)
Dept: FAMILY MEDICINE | Facility: CLINIC | Age: 27
End: 2022-06-03
Payer: COMMERCIAL

## 2022-06-03 VITALS
HEART RATE: 86 BPM | DIASTOLIC BLOOD PRESSURE: 70 MMHG | SYSTOLIC BLOOD PRESSURE: 110 MMHG | WEIGHT: 187.56 LBS | BODY MASS INDEX: 30.05 KG/M2

## 2022-06-03 DIAGNOSIS — L70.0 ACNE VULGARIS: ICD-10-CM

## 2022-06-03 DIAGNOSIS — G43.109 MIGRAINE WITH AURA AND WITHOUT STATUS MIGRAINOSUS, NOT INTRACTABLE: ICD-10-CM

## 2022-06-03 DIAGNOSIS — E28.2 PCOS (POLYCYSTIC OVARIAN SYNDROME): ICD-10-CM

## 2022-06-03 DIAGNOSIS — N39.0 RECURRENT UTI: Primary | ICD-10-CM

## 2022-06-03 PROCEDURE — 99214 OFFICE O/P EST MOD 30 MIN: CPT | Performed by: FAMILY MEDICINE

## 2022-06-03 RX ORDER — SPIRONOLACTONE 25 MG/1
25 TABLET ORAL DAILY
Qty: 30 TABLET | Refills: 1 | Status: SHIPPED | OUTPATIENT
Start: 2022-06-03 | End: 2022-06-26

## 2022-06-03 RX ORDER — TRETINOIN 0.5 MG/G
CREAM TOPICAL AT BEDTIME
Qty: 45 G | Refills: 1 | Status: SHIPPED | OUTPATIENT
Start: 2022-06-03 | End: 2023-12-20

## 2022-06-03 RX ORDER — SUMATRIPTAN 50 MG/1
50 TABLET, FILM COATED ORAL
Qty: 12 TABLET | Refills: 1 | Status: SHIPPED | OUTPATIENT
Start: 2022-06-03

## 2022-06-03 NOTE — PATIENT INSTRUCTIONS
If you are having symptoms, schedule lab only visit for urinalysis and urine culture   -Dr Tracey sees the result very quickly and can send in an antibiotic if needed    Would consider kidney ultrasound in future if persisting

## 2022-06-25 DIAGNOSIS — E28.2 PCOS (POLYCYSTIC OVARIAN SYNDROME): ICD-10-CM

## 2022-06-25 DIAGNOSIS — L70.0 ACNE VULGARIS: ICD-10-CM

## 2022-06-26 RX ORDER — SPIRONOLACTONE 25 MG/1
TABLET ORAL
Qty: 30 TABLET | Refills: 1 | Status: SHIPPED | OUTPATIENT
Start: 2022-06-26 | End: 2022-08-01

## 2022-06-26 NOTE — TELEPHONE ENCOUNTER
"Routing refill request to provider for review/approval because:  Labs not current:  K+, Na+, Cr    Last Written Prescription Date:  6/3/22  Last Fill Quantity: 30,  # refills: 1   Last office visit provider:  6/3/22     Requested Prescriptions   Pending Prescriptions Disp Refills     spironolactone (ALDACTONE) 25 MG tablet [Pharmacy Med Name: SPIRONOLACTONE 25 MG TABLET] 30 tablet 1     Sig: TAKE 1 TABLET BY MOUTH EVERY DAY       Diuretics (Including Combos) Protocol Failed - 6/25/2022  8:32 AM        Failed - Normal serum creatinine on file in past 12 months     No lab results found.           Failed - Normal serum potassium on file in past 12 months     No lab results found.                 Failed - Normal serum sodium on file in past 12 months     No lab results found.           Passed - Blood pressure under 140/90 in past 12 months     BP Readings from Last 3 Encounters:   06/03/22 110/70   05/27/22 114/68   12/17/21 116/68                 Passed - Recent (12 mo) or future (30 days) visit within the authorizing provider's specialty     Patient has had an office visit with the authorizing provider or a provider within the authorizing providers department within the previous 12 mos or has a future within next 30 days. See \"Patient Info\" tab in inbasket, or \"Choose Columns\" in Meds & Orders section of the refill encounter.              Passed - Medication is active on med list        Passed - Patient is age 18 or older        Passed - No active pregancy on record        Passed - No positive pregnancy test in past 12 months             Jeaneth Stovall 06/25/22 8:30 PM  "

## 2022-06-29 ENCOUNTER — OFFICE VISIT (OUTPATIENT)
Dept: URGENT CARE | Facility: URGENT CARE | Age: 27
End: 2022-06-29
Payer: COMMERCIAL

## 2022-06-29 VITALS
HEART RATE: 81 BPM | SYSTOLIC BLOOD PRESSURE: 96 MMHG | RESPIRATION RATE: 15 BRPM | DIASTOLIC BLOOD PRESSURE: 72 MMHG | WEIGHT: 185 LBS | TEMPERATURE: 98.1 F | HEIGHT: 66 IN | BODY MASS INDEX: 29.73 KG/M2 | OXYGEN SATURATION: 100 %

## 2022-06-29 DIAGNOSIS — S61.215A LACERATION OF LEFT RING FINGER WITHOUT DAMAGE TO NAIL, FOREIGN BODY PRESENCE UNSPECIFIED, INITIAL ENCOUNTER: Primary | ICD-10-CM

## 2022-06-29 PROCEDURE — 12001 RPR S/N/AX/GEN/TRNK 2.5CM/<: CPT | Performed by: STUDENT IN AN ORGANIZED HEALTH CARE EDUCATION/TRAINING PROGRAM

## 2022-06-29 NOTE — PROGRESS NOTES
Jazmin Rocha is a 27 year old female who presents to the clinic with a laceration on the finger sustained within the last few hours.  This is a non-work related injury.  Mechanism of injury: glass.    Associated symptoms: Denies numbness, weakness, or loss of function  Last tetanus booster within 10 years: yes    Patient Active Problem List   Diagnosis     PCOS (polycystic ovarian syndrome)     Migraine with aura and without status migrainosus     IUD (intrauterine device) in place     Endometriosis     Dysmenorrhea     Irregular periods       Social History     Socioeconomic History     Marital status: Single     Spouse name: Not on file     Number of children: Not on file     Years of education: Not on file     Highest education level: Not on file   Occupational History     Not on file   Tobacco Use     Smoking status: Never Smoker     Smokeless tobacco: Never Used   Substance and Sexual Activity     Alcohol use: Yes     Drug use: No     Sexual activity: Never   Other Topics Concern     Not on file   Social History Narrative     Not on file     Social Determinants of Health     Financial Resource Strain: Not on file   Food Insecurity: Not on file   Transportation Needs: Not on file   Physical Activity: Not on file   Stress: Not on file   Social Connections: Not on file   Intimate Partner Violence: Not on file   Housing Stability: Not on file       Current Outpatient Medications   Medication Sig Dispense Refill     amitriptyline (ELAVIL) 25 MG tablet TAKE 1 TABLET BY MOUTH EVERYDAY AT BEDTIME 90 tablet 2     levonorgestrel (MIRENA) 20 MCG/24HR IUD 1 each by Intrauterine route continuous       spironolactone (ALDACTONE) 25 MG tablet TAKE 1 TABLET BY MOUTH EVERY DAY 30 tablet 1     SUMAtriptan (IMITREX) 50 MG tablet Take 1 tablet (50 mg) by mouth at onset of headache for migraine May repeat in 2 hours. Max 8 tablets/24 hours. 12 tablet 1     tretinoin (RETIN-A) 0.05 % external cream Apply topically At Bedtime 45 g 1  "      EXAM: The patient appears today in alert distress  VITALS: Blood pressure 96/72, pulse 81, temperature 98.1  F (36.7  C), temperature source Temporal, resp. rate 15, height 1.683 m (5' 6.25\"), weight 83.9 kg (185 lb), last menstrual period 05/19/2022, SpO2 100 %, not currently breastfeeding.    Size of laceration: 1.5 centimeters  Characteristics of the laceration: bleeding- mild, clean, extends into subcutaneous fat and straight  Tendon function intact: yes  Sensation to light touch intact: yes  Pulses intact: yes    Picture included in patient's chart: no    Assessment:  1.5 centimeter laceration    PLAN:  Wound was locally injected with 1 cc's of Lidocaine 2% plain  Wound cleaned with betadine/saline solution  Laceration was closed using 2 4-0 nylon interrupted sutures  Dermabond was applied  After care instructions:  Keep wound clean and dry for the next 24-48 hours  Sutures out in 10-14 days  Signs of infection discussed today  May return to work as long as wound is kept clean and dry    "

## 2022-07-30 DIAGNOSIS — E28.2 PCOS (POLYCYSTIC OVARIAN SYNDROME): ICD-10-CM

## 2022-07-30 DIAGNOSIS — L70.0 ACNE VULGARIS: ICD-10-CM

## 2022-07-31 NOTE — TELEPHONE ENCOUNTER
"Routing refill request to provider for review/approval because:  Labs not current:  multi    Last Written Prescription Date:  6/26/22  Last Fill Quantity: 30,  # refills: 1   Last office visit provider:  6/3/22     Requested Prescriptions   Pending Prescriptions Disp Refills     spironolactone (ALDACTONE) 25 MG tablet [Pharmacy Med Name: SPIRONOLACTONE 25 MG TABLET] 30 tablet 1     Sig: TAKE 1 TABLET BY MOUTH EVERY DAY       Diuretics (Including Combos) Protocol Failed - 7/30/2022  8:32 AM        Failed - Normal serum creatinine on file in past 12 months     No lab results found.           Failed - Normal serum potassium on file in past 12 months     No lab results found.                 Failed - Normal serum sodium on file in past 12 months     No lab results found.           Passed - Blood pressure under 140/90 in past 12 months     BP Readings from Last 3 Encounters:   06/29/22 96/72   06/03/22 110/70   05/27/22 114/68                 Passed - Recent (12 mo) or future (30 days) visit within the authorizing provider's specialty     Patient has had an office visit with the authorizing provider or a provider within the authorizing providers department within the previous 12 mos or has a future within next 30 days. See \"Patient Info\" tab in inbasket, or \"Choose Columns\" in Meds & Orders section of the refill encounter.              Passed - Medication is active on med list        Passed - Patient is age 18 or older        Passed - No active pregancy on record        Passed - No positive pregnancy test in past 12 months             Radhika Elizabeth RN 07/31/22 12:03 PM  "

## 2022-08-01 RX ORDER — SPIRONOLACTONE 25 MG/1
TABLET ORAL
Qty: 30 TABLET | Refills: 0 | Status: SHIPPED | OUTPATIENT
Start: 2022-08-01 | End: 2022-08-28

## 2022-08-12 ENCOUNTER — TELEPHONE (OUTPATIENT)
Dept: FAMILY MEDICINE | Facility: CLINIC | Age: 27
End: 2022-08-12

## 2022-08-12 NOTE — TELEPHONE ENCOUNTER
Reason for Call:  Immunization complication     Detailed comments:   Received a call from patient stating regarding complications after receiving her covid booster.     Pt states that it was injected too high and now patient has been having shoulder pain and limited ROM, unable to physically do internal rotations and when she tries that is when she gets pain and cracking/popping.    Pt received her booster at [facility] about 4 weeks ago and they informed her to contact her PCP to address further.     Pt states that she works at [employer] and had one of the providers unofficially advised and confirmed that her axillary nerve was not damaged but they are not sure what the problem is.     Please advise and contact patient with further MD recommendations.     Please leave voicemail if not able to reach.     Phone Number Patient can be reached at: Cell number on file:    Telephone Information:   Mobile [phone]       Best Time: anytime     Can we leave a detailed message on this number? YES    Call taken on 8/12/2022 at 11:45 AM by Yulia Sidhu

## 2022-08-25 ENCOUNTER — OFFICE VISIT (OUTPATIENT)
Dept: FAMILY MEDICINE | Facility: CLINIC | Age: 27
End: 2022-08-25
Payer: COMMERCIAL

## 2022-08-25 VITALS
DIASTOLIC BLOOD PRESSURE: 70 MMHG | TEMPERATURE: 98.2 F | WEIGHT: 190.56 LBS | BODY MASS INDEX: 30.53 KG/M2 | HEART RATE: 84 BPM | SYSTOLIC BLOOD PRESSURE: 100 MMHG | OXYGEN SATURATION: 100 %

## 2022-08-25 DIAGNOSIS — T50.Z95A SHOULDER INJURY RELATED TO VACCINE ADMINISTRATION (SIRVA): Primary | ICD-10-CM

## 2022-08-25 DIAGNOSIS — S49.80XA SHOULDER INJURY RELATED TO VACCINE ADMINISTRATION (SIRVA): Primary | ICD-10-CM

## 2022-08-25 DIAGNOSIS — Z11.1 TUBERCULOSIS SCREENING: ICD-10-CM

## 2022-08-25 PROCEDURE — 86481 TB AG RESPONSE T-CELL SUSP: CPT | Performed by: FAMILY MEDICINE

## 2022-08-25 PROCEDURE — 36415 COLL VENOUS BLD VENIPUNCTURE: CPT | Performed by: FAMILY MEDICINE

## 2022-08-25 PROCEDURE — 99214 OFFICE O/P EST MOD 30 MIN: CPT | Performed by: FAMILY MEDICINE

## 2022-08-25 RX ORDER — PREDNISONE 20 MG/1
40 TABLET ORAL DAILY
Qty: 10 TABLET | Refills: 0 | Status: SHIPPED | OUTPATIENT
Start: 2022-08-25 | End: 2022-08-30

## 2022-08-25 NOTE — PROGRESS NOTES
Assessment/Plan:    Shoulder injury related to vaccine administration (SIRVA)  Suspect SIRVA from location of vaccine administration. Discussed best evidence for treatment is PT and intra-articular glucocorticoid injection. Pt worried about cost and asks if we can try oral prednisone - discussed evidence suggests this is unlikely to be beneficial but given overall low risk and much cheaper will try. Will reports to [facility] though suspect related to vaccine administration rather than vaccine itself.  - Physical Therapy Referral  - Orthopedic  Referral  - predniSONE (DELTASONE) 20 MG tablet  Dispense: 10 tablet; Refill: 0    Tuberculosis screening  Needs screening for school, ordered today.  - Quantiferon TB Gold Plus       Follow up: as needed    Ilda Tracey MD  [facility]    Subjective:   Jazmin Rocha is a 27 year old female is here today for shoulder pain following vaccination    -vaccine 7/14 - while it was happening felt like it was really high  -that night arm was sore like regular vaccine but then next 5 days was significantly painful, constant, severe  -now painful if moved in a certain way and then pain is severe  -decreased ROM   -works at [employer]    -needs TB testing for school    Answers for HPI/ROS submitted by the patient on 8/25/2022  How many servings of fruits and vegetables do you eat daily?: 2-3  On average, how many sweetened beverages do you drink each day (Examples: soda, juice, sweet tea, etc.  Do NOT count diet or artificially sweetened beverages)?: 0  How many minutes a day do you exercise enough to make your heart beat faster?: 20 to 29  How many days a week do you exercise enough to make your heart beat faster?: 4  How many days per week do you miss taking your medication?: 1  What is the reason for your visit today?: Shoulder injury following Covid booster vaccine  When did your symptoms begin?: More than a month  What are your symptoms?: Shoulder pain and lack of  mobility  How would you describe these symptoms?: Moderate  Are your symptoms:: Staying the same  Have you had these symptoms before?: No  Is there anything that makes you feel worse?: ROM  Is there anything that makes you feel better?: No      Patient Active Problem List   Diagnosis     PCOS (polycystic ovarian syndrome)     Migraine with aura and without status migrainosus     IUD (intrauterine device) in place     Endometriosis     Dysmenorrhea     Irregular periods     Past Medical History:   Diagnosis Date     Migraine      Past Surgical History:   Procedure Laterality Date     NO HISTORY OF SURGERY       Current Outpatient Medications   Medication     amitriptyline (ELAVIL) 25 MG tablet     levonorgestrel (MIRENA) 20 MCG/24HR IUD     predniSONE (DELTASONE) 20 MG tablet     spironolactone (ALDACTONE) 25 MG tablet     SUMAtriptan (IMITREX) 50 MG tablet     tretinoin (RETIN-A) 0.05 % external cream     No current facility-administered medications for this visit.     Allergies   Allergen Reactions     Azithromycin Hives     Other Environmental Allergy      Zithromax Z-Richard [Azithromycin] Hives     Social History     Socioeconomic History     Marital status: Single     Spouse name: Not on file     Number of children: Not on file     Years of education: Not on file     Highest education level: Not on file   Occupational History     Not on file   Tobacco Use     Smoking status: Never Smoker     Smokeless tobacco: Never Used   Substance and Sexual Activity     Alcohol use: Yes     Drug use: No     Sexual activity: Never   Other Topics Concern     Not on file   Social History Narrative     Not on file     Social Determinants of Health     Financial Resource Strain: Not on file   Food Insecurity: Not on file   Transportation Needs: Not on file   Physical Activity: Not on file   Stress: Not on file   Social Connections: Not on file   Intimate Partner Violence: Not on file   Housing Stability: Not on file     Family  History   Problem Relation Age of Onset     Neurologic Disorder Mother         migraine headaches in mother and maternal relatives     Asthma Father         exercise induced asthma     Cancer Maternal Aunt         uterine - age 40's     Hypertension Maternal Aunt      Lipids Maternal Grandfather      Diabetes Maternal Grandmother         type 2     Breast Cancer Paternal Grandmother      Hypertension Paternal Grandmother      Gastrointestinal Disease Paternal Grandmother         IBS (irritable bowel sydrome)     Gynecology Mother         kidney stones (calcium)     Hypertension Mother      Hypotension Father      No Known Problems Sister      Hypertension Maternal Grandmother      Hypertension Maternal Grandfather      No Known Problems Paternal Grandfather      Review of systems is as stated in HPI, and the remainder of system review is otherwise negative.    Objective:     /70   Pulse 84   Temp 98.2  F (36.8  C)   Wt 86.4 kg (190 lb 9 oz)   SpO2 100%   BMI 30.53 kg/m      General appearance: awake, NAD  HEENT: atraumatic, normocephalic, no scleral icterus or injection  Lungs: breathing comfortably on room air  L arm: pain in lateral posterior region, decreased ROM (especially internal rotation, adduction, flexion)  Neuro: alert, oriented x3, CNs grossly intact, no focal deficits appreciated  Psych: normal mood/affect/behavior, answering questions appropriately, linear thought process

## 2022-08-28 LAB
GAMMA INTERFERON BACKGROUND BLD IA-ACNC: 0 IU/ML
M TB IFN-G BLD-IMP: NEGATIVE
M TB IFN-G CD4+ BCKGRND COR BLD-ACNC: 10 IU/ML
MITOGEN IGNF BCKGRD COR BLD-ACNC: 0 IU/ML
MITOGEN IGNF BCKGRD COR BLD-ACNC: 0 IU/ML
QUANTIFERON MITOGEN: 10 IU/ML
QUANTIFERON NIL TUBE: 0 IU/ML
QUANTIFERON TB1 TUBE: 0 IU/ML
QUANTIFERON TB2 TUBE: 0

## 2022-09-07 ENCOUNTER — TELEPHONE (OUTPATIENT)
Dept: FAMILY MEDICINE | Facility: CLINIC | Age: 27
End: 2022-09-07

## 2022-09-07 NOTE — TELEPHONE ENCOUNTER
Patient would like to her [facility] results released to her mychart so she can print it off for school.

## 2022-09-07 NOTE — LETTER
September 8, 2022      Jazmin Rocha  [address]  [place] [address]              Dear Jazmin,    Your TB Gold lab test is negative.     Component Value Flag Ref Range Units Status   Quantiferon-TB Gold Plus Negative   Negative  Final   Comment:   No interferon gamma response to M.tuberculosis antigens was detected. Infection with M.tuberculosis is unlikely, however a single negative result does not exclude infection. In patients at high risk for infection, a second test should be considered in accordance with the 2017 ATS/IDSA/CDC Clinical Pract   ice Guidelines for Diagnosis of Tuberculosis in Adults and Children    TB1 Ag minus Nil Value 0.00    IU/mL Final   TB2 Ag minus Nil Value 0.00    IU/mL Final   Mitogen minus Nil Result 10.00    IU/mL Final   Nil Result 0.00    IU/mL Final         Sincerely,      Ilda Tracey MD

## 2022-09-07 NOTE — LETTER
September 8, 2022      Jazmin Rocha  [address] [place]  [place] [address]              Dear Jazmin,    Your TB Gold lab test is negative.     Component Value Flag Ref Range Units Status   Quantiferon-TB Gold Plus Negative   Negative  Final   Comment:   No interferon gamma response to M.tuberculosis antigens was detected. Infection with M.tuberculosis is unlikely, however a single negative result does not exclude infection. In patients at high risk for infection, a second test should be considered in accordance with the 2017 ATS/IDSA/CDC Clinical Pract   ice Guidelines for Diagnosis of Tuberculosis in Adults and Children    TB1 Ag minus Nil Value 0.00    IU/mL Final   TB2 Ag minus Nil Value 0.00    IU/mL Final   Mitogen minus Nil Result 10.00    IU/mL Final   Nil Result 0.00    IU/mL Final         Sincerely,      Ilda Tracey MD

## 2022-09-08 NOTE — TELEPHONE ENCOUNTER
Results were resulted. Letter created for patient to print off and give to her school. Letter released to My Chart.

## 2022-10-02 ENCOUNTER — HEALTH MAINTENANCE LETTER (OUTPATIENT)
Age: 27
End: 2022-10-02

## 2023-02-04 DIAGNOSIS — G43.109 MIGRAINE WITH AURA AND WITHOUT STATUS MIGRAINOSUS, NOT INTRACTABLE: ICD-10-CM

## 2023-02-05 NOTE — TELEPHONE ENCOUNTER
"Last Written Prescription Date:  2/24/22  Last Fill Quantity: 90,  # refills: 2   Last office visit provider:  8/25/22     Requested Prescriptions   Pending Prescriptions Disp Refills     amitriptyline (ELAVIL) 25 MG tablet [Pharmacy Med Name: AMITRIPTYLINE HCL 25 MG TAB] 90 tablet 2     Sig: TAKE 1 TABLET BY MOUTH EVERYDAY AT BEDTIME       Tricyclic Agents ( Annual appt and no PHQ9) Passed - 2/4/2023 12:23 AM        Passed - Blood Pressure under 140/90 in past 12 mos     BP Readings from Last 3 Encounters:   08/25/22 100/70   06/29/22 96/72   06/03/22 110/70                 Passed - Recent (12 mo) or future (30 days) visit within authorizing provider's specialty     Patient has had an office visit with the authorizing provider or a provider within the authorizing providers department within the previous 12 mos or has a future within next 30 days. See \"Patient Info\" tab in inbasket, or \"Choose Columns\" in Meds & Orders section of the refill encounter.              Passed - Medication is active on med list        Passed - Patient is age 18 or older        Passed - Patient is not pregnant        Passed - No positive pregnancy test on record in past 12 mos             Radhika Elizabeth RN 02/05/23 5:52 PM  "

## 2023-02-11 ENCOUNTER — HEALTH MAINTENANCE LETTER (OUTPATIENT)
Age: 28
End: 2023-02-11

## 2023-08-17 DIAGNOSIS — G43.109 MIGRAINE WITH AURA AND WITHOUT STATUS MIGRAINOSUS, NOT INTRACTABLE: ICD-10-CM

## 2023-08-17 NOTE — TELEPHONE ENCOUNTER
"Last Written Prescription Date:  2/5/2023  Last Fill Quantity: 90,  # refills: 1   Last office visit provider:  8/25/2022     Requested Prescriptions   Pending Prescriptions Disp Refills    amitriptyline (ELAVIL) 25 MG tablet [Pharmacy Med Name: AMITRIPTYLINE HCL 25 MG TAB] 90 tablet 1     Sig: TAKE 1 TABLET BY MOUTH EVERYDAY AT BEDTIME       Tricyclic Agents ( Annual appt and no PHQ9) Passed - 8/17/2023 12:52 AM        Passed - Blood Pressure under 140/90 in past 12 mos     BP Readings from Last 3 Encounters:   08/25/22 100/70   06/29/22 96/72   06/03/22 110/70                 Passed - Recent (12 mo) or future (30 days) visit within authorizing provider's specialty     Patient has had an office visit with the authorizing provider or a provider within the authorizing providers department within the previous 12 mos or has a future within next 30 days. See \"Patient Info\" tab in inbasket, or \"Choose Columns\" in Meds & Orders section of the refill encounter.              Passed - Medication is active on med list        Passed - Patient is age 18 or older        Passed - Patient is not pregnant        Passed - No positive pregnancy test on record in past 12 mos             Irina Liz RN 08/17/23 8:21 AM  "

## 2023-09-01 ENCOUNTER — TELEPHONE (OUTPATIENT)
Dept: FAMILY MEDICINE | Facility: CLINIC | Age: 28
End: 2023-09-01

## 2023-09-01 DIAGNOSIS — Z11.1 TUBERCULOSIS SCREENING: Primary | ICD-10-CM

## 2023-09-01 NOTE — TELEPHONE ENCOUNTER
Order/Referral Request    Who is requesting: Patient    Orders being requested: [facility] TB Testing    Reason service is needed/diagnosis: Need testing patient is about to start clinicals in school    Last Visit with PCP = 08/25/22  Next Visit with PCP = nothing scheduled    Patient declined offer to schedule PHY, states she will schedule on [facility]    When are orders needed by: as soon as feasible.     Has this been discussed with Provider: No    Does patient have a preference on a Group/Provider/Facility? [facility]    Does patient have an appointment scheduled?: No    Where to send orders: Place orders within Epic    Could we send this information to you in Pharmacy Development or would you prefer to receive a phone call?:   Patient would like to be contacted via Pharmacy Development

## 2023-09-01 NOTE — TELEPHONE ENCOUNTER
I put in orders for TB quant blood test and mantoux test - pt can do either for TB screening for school    Dr Tracey

## 2023-09-15 ENCOUNTER — TELEPHONE (OUTPATIENT)
Dept: FAMILY MEDICINE | Facility: CLINIC | Age: 28
End: 2023-09-15

## 2023-09-15 ENCOUNTER — LAB (OUTPATIENT)
Dept: LAB | Facility: CLINIC | Age: 28
End: 2023-09-15

## 2023-09-15 DIAGNOSIS — Z11.1 TUBERCULOSIS SCREENING: ICD-10-CM

## 2023-09-15 PROCEDURE — 86481 TB AG RESPONSE T-CELL SUSP: CPT

## 2023-09-15 PROCEDURE — 36415 COLL VENOUS BLD VENIPUNCTURE: CPT

## 2023-09-15 NOTE — TELEPHONE ENCOUNTER
Writer was asked by a  to check on the patient as the patient was having a blood draw done and she started to feel lightheaded and fainted in the lab chair.    Writer immediately assessed the patient, who was in the still in the blood draw chair.    Patient was alert and talking coherently and was able to make complete sentences, as well as able to make sense when talking with the writer.    Patient stated that she feels faint after every blood draw and that her response to the blood draw was normal and baseline for her.    Dr. Lindquist assessed the patient and verified the patient was recovered from her syncope episode and stated that the patient was well enough to leave the clinic of her own accord.    Patient's vitals at 1:22 pm were:    BP  110/60    HR was 103 bpm, but went down to 98 bpm when the patient was ready to leave the clinic.    Lung sounds were clear.    O2 sat was 98%    After her vitals were taken, the patient stated that she felt well enough to leave the clinic and drive home on her own.    Patient was able to stand and walk on her own out of the clinic.    Denied any other questions or concerns at this time.    Caity Lang RN, BSN  [facility]

## 2023-09-17 LAB
QUANTIFERON MITOGEN: 10 IU/ML
QUANTIFERON NIL TUBE: 0 IU/ML
QUANTIFERON TB1 TUBE: 0.03 IU/ML
QUANTIFERON TB2 TUBE: 0.02

## 2023-09-18 LAB
GAMMA INTERFERON BACKGROUND BLD IA-ACNC: 0 IU/ML
M TB IFN-G BLD-IMP: NEGATIVE
M TB IFN-G CD4+ BCKGRND COR BLD-ACNC: 10 IU/ML
MITOGEN IGNF BCKGRD COR BLD-ACNC: 0.02 IU/ML
MITOGEN IGNF BCKGRD COR BLD-ACNC: 0.03 IU/ML

## 2023-11-12 DIAGNOSIS — G43.109 MIGRAINE WITH AURA AND WITHOUT STATUS MIGRAINOSUS, NOT INTRACTABLE: ICD-10-CM

## 2023-12-18 NOTE — PROGRESS NOTES
Assessment/Plan:   Jazmin is a 28 year old female here for physical with additional concern    Routine adult health maintenance  Physical completed today.  Vaccine as below.  Labs as below.  Pap smear completed today.    Cervical cancer screening  Due for Pap smear, completed today.  - Pap Screen reflex to HPV if ASCUS - recommend age 25 - 29    Encounter for vaccination  Due for HPV vaccine, patient interested in this, given today and then future doses ordered.  - HPV 9Y+ (Gardasil 9)  - HPV 9Y+ (Gardasil 9)    Fatigue, unspecified type  Weight gain  Patient reports issues with fatigue and weight gain, will check labs as below for further evaluation.  - TSH with free T4 reflex  - Hemoglobin  - Iron and iron binding capacity    Vaginal discharge  Some vaginal discharge noted on exam, wet prep obtained and will treat if needed.  - Wet prep - Clinic Collect       I have had an Advance Directives discussion with the patient.  The following high BMI interventions were performed this visit: encouragement to exercise and lifestyle education regarding diet    Follow up: 1 year for physical, sooner as needed    Ilda Tracey MD    [facility]      Subjective:     Jazmin Rocha is a 28 year old female who presents for an annual exam.     Answers submitted by the patient for this visit:  Annual Preventive Visit (Submitted on 12/19/2023)  Chief Complaint: Annual Exam:  Frequency of exercise:: 2-3 days/week  Getting at least 3 servings of Calcium per day:: Yes  Diet:: Regular (no restrictions)  Taking medications regularly:: Yes  Medication side effects:: None  Bi-annual eye exam:: NO  Dental care twice a year:: NO  Sleep apnea or symptoms of sleep apnea:: None  abdominal pain: No  Blood in stool: No  Blood in urine: No  chest pain: No  chills: No  congestion: Yes  constipation: No  cough: No  diarrhea: No  dizziness: No  ear pain: No  eye pain: Yes  nervous/anxious: No  fever: No  frequency: No  genital  sores: No  headaches: No  hearing loss: No  heartburn: No  arthralgias: No  joint swelling: No  peripheral edema: No  mood changes: No  myalgias: No  nausea: No  dysuria: No  palpitations: No  Skin sensation changes: No  sore throat: Yes  urgency: No  rash: No  shortness of breath: No  visual disturbance: No  weakness: No  pelvic pain: No  vaginal bleeding: No  vaginal discharge: No  tenderness: Yes  breast mass: No  breast discharge: Yes  Additional concerns today:: No  Exercise outside of work (Submitted on 12/19/2023)  Chief Complaint: Annual Exam:  Duration of exercise:: 30-45 minutes    Dx with pink eye and started medication    Migraines overall doing ok - not needing imitrex and amitriptyline is working well, getting better/consistent sleep    Wonders somewhat about thyroid function - fatigue, weight gain    Health Maintenance reviewed:  Lipid Profile: no  Glucose Screen: no  Colonoscopy: no  Mammogram: no    Gynecologic History  No LMP recorded. (Menstrual status: IUD). Spotting for past 6 months or so, random/no pattern - anticipating probably needing removal/reinsertion at 5 years  Contraception: IUD Mirena  Last Pap: 2019. Results were: nml - due now    Immunization History   Administered Date(s) Administered    COVID-19 12+ (2023-24) (MODERNA) 10/04/2023    COVID-19 Monovalent 12+ (Pfizer 2022) 07/14/2022    COVID-19 Monovalent 18+ (Moderna) 02/03/2021, 03/03/2021    DTAP (<7y) 1997, 1997, 01/13/1998, 10/08/1998, 05/09/2002    HEPA 06/29/2009, 08/21/2012    HIB (PRP-T) 1997, 1997, 01/13/1998, 10/08/1998    HPV9 12/20/2023    HepB 1997, 1997, 01/13/1998    Influenza Vaccine >6 months,quad, PF 10/03/2022, 10/04/2023    Influenza Vaccine, 6+MO IM (QUADRIVALENT W/PRESERVATIVES) 10/05/2020    Influenza,INJ,MDCK,PF,Quad >6mo(Flucelvax) 10/04/2023    MMR 10/08/1998, 09/27/2000    Meningococcal ACWY (Menactra ) 12/09/2014, 08/05/2015    Poliovirus, inactivated (IPV)  1997, 1997, 10/08/1998, 05/09/2002    TD,PF 7+ (Tenivac) 10/07/2020    TDAP Vaccine (Boostrix) 06/29/2009    Typhoid IM 02/13/2017    Varicella 09/27/2000, 06/29/2009     Immunization status: up to date and documented.    Current Outpatient Medications   Medication Sig Dispense Refill    amitriptyline (ELAVIL) 25 MG tablet TAKE 1 TABLET BY MOUTH EVERYDAY AT BEDTIME 90 tablet 0    levonorgestrel (MIRENA) 20 MCG/24HR IUD 1 each by Intrauterine route continuous      SUMAtriptan (IMITREX) 50 MG tablet Take 1 tablet (50 mg) by mouth at onset of headache for migraine May repeat in 2 hours. Max 8 tablets/24 hours. 12 tablet 1     Past Medical History:   Diagnosis Date    Migraine      Past Surgical History:   Procedure Laterality Date    NO HISTORY OF SURGERY       Azithromycin, Other environmental allergy, and Zithromax z-miley [azithromycin]  Family History   Problem Relation Age of Onset    Neurologic Disorder Mother         migraine headaches in mother and maternal relatives    Asthma Father         exercise induced asthma    Cancer Maternal Aunt         uterine - age 40's    Hypertension Maternal Aunt     Lipids Maternal Grandfather     Diabetes Maternal Grandmother         type 2    Breast Cancer Paternal Grandmother     Hypertension Paternal Grandmother     Gastrointestinal Disease Paternal Grandmother         IBS (irritable bowel sydrome)    Gynecology Mother         kidney stones (calcium)    Hypertension Mother     Hypotension Father     No Known Problems Sister     Hypertension Maternal Grandmother     Hypertension Maternal Grandfather     No Known Problems Paternal Grandfather      Social History     Socioeconomic History    Marital status:      Spouse name: Not on file    Number of children: Not on file    Years of education: Not on file    Highest education level: Not on file   Occupational History    Not on file   Tobacco Use    Smoking status: Never    Smokeless tobacco: Never   Substance  "and Sexual Activity    Alcohol use: Yes    Drug use: No    Sexual activity: Never   Other Topics Concern    Not on file   Social History Narrative    Not on file     Social Determinants of Health     Financial Resource Strain: Low Risk  (12/19/2023)    Financial Resource Strain     Within the past 12 months, have you or your family members you live with been unable to get utilities (heat, electricity) when it was really needed?: No   Food Insecurity: Low Risk  (12/19/2023)    Food Insecurity     Within the past 12 months, did you worry that your food would run out before you got money to buy more?: No     Within the past 12 months, did the food you bought just not last and you didn t have money to get more?: No   Transportation Needs: Low Risk  (12/19/2023)    Transportation Needs     Within the past 12 months, has lack of transportation kept you from medical appointments, getting your medicines, non-medical meetings or appointments, work, or from getting things that you need?: No   Physical Activity: Not on file   Stress: Not on file   Social Connections: Not on file   Interpersonal Safety: Low Risk  (12/20/2023)    Interpersonal Safety     Do you feel physically and emotionally safe where you currently live?: Yes     Within the past 12 months, have you been hit, slapped, kicked or otherwise physically hurt by someone?: No     Within the past 12 months, have you been humiliated or emotionally abused in other ways by your partner or ex-partner?: No   Housing Stability: Low Risk  (12/19/2023)    Housing Stability     Do you have housing? : Yes     Are you worried about losing your housing?: No       Review of Systems negative unless noted    Objective:        Vitals:    12/20/23 1125   BP: 100/70   Pulse: 100   Resp: 16   Temp: 98.7  F (37.1  C)   TempSrc: Temporal   SpO2: 97%   Weight: 92.5 kg (203 lb 14.4 oz)   Height: 1.676 m (5' 6\")   PainSc: No Pain (0)     Body mass index is 32.91 kg/m .    Physical " Exam:  General Appearance: Alert, pleasant, appears stated age  Head: Normocephalic, without obvious abnormality  Eyes: PERRL, EOM's intact - bilateral conjunctivitis, R>L  Ears: Normal TM's and external ear canals, both ears  Nose: Nares normal, septum midline,mucosa normal, no drainage  Throat: Lips, mucosa, and tongue normal; teeth and gums normal; oropharynx is clear  Neck: Supple,without lymphadenopathy, mild thyromegaly but no nodules noted  Lungs: Clear to auscultation bilaterally, respirations unlabored, no wheezing or crackles  Heart: Regular rate and rhythm, no murmur   Abdomen: Soft, non-tender, no masses, no organomegaly  Extremities: Extremities with strong and symmetric pulses, no cyanosis or edema  Skin: Skin color, texture normal, no rashes or lesions  Neurologic: Grossly normal, no focal deficits  Pelvic exam: Normal external genitalia, normal-appearing cervix, IUD in place, moderate white discharge

## 2023-12-19 ASSESSMENT — ENCOUNTER SYMPTOMS
SORE THROAT: 1
DYSURIA: 0
CHILLS: 0
JOINT SWELLING: 0
CONSTIPATION: 0
HEMATURIA: 0
EYE PAIN: 1
MYALGIAS: 0
HEMATOCHEZIA: 0
PALPITATIONS: 0
ABDOMINAL PAIN: 0
DIARRHEA: 0
DIZZINESS: 0
HEADACHES: 0
NAUSEA: 0
FEVER: 0
PARESTHESIAS: 0
FREQUENCY: 0
HEARTBURN: 0
NERVOUS/ANXIOUS: 0
BREAST MASS: 0
SHORTNESS OF BREATH: 0
WEAKNESS: 0
COUGH: 0
ARTHRALGIAS: 0

## 2023-12-20 ENCOUNTER — OFFICE VISIT (OUTPATIENT)
Dept: FAMILY MEDICINE | Facility: CLINIC | Age: 28
End: 2023-12-20
Payer: COMMERCIAL

## 2023-12-20 VITALS
HEART RATE: 100 BPM | BODY MASS INDEX: 32.77 KG/M2 | WEIGHT: 203.9 LBS | SYSTOLIC BLOOD PRESSURE: 100 MMHG | RESPIRATION RATE: 16 BRPM | OXYGEN SATURATION: 97 % | TEMPERATURE: 98.7 F | HEIGHT: 66 IN | DIASTOLIC BLOOD PRESSURE: 70 MMHG

## 2023-12-20 DIAGNOSIS — Z23 ENCOUNTER FOR VACCINATION: ICD-10-CM

## 2023-12-20 DIAGNOSIS — N89.8 VAGINAL DISCHARGE: ICD-10-CM

## 2023-12-20 DIAGNOSIS — Z12.4 CERVICAL CANCER SCREENING: ICD-10-CM

## 2023-12-20 DIAGNOSIS — B37.31 YEAST INFECTION OF THE VAGINA: ICD-10-CM

## 2023-12-20 DIAGNOSIS — Z00.00 ROUTINE ADULT HEALTH MAINTENANCE: Primary | ICD-10-CM

## 2023-12-20 DIAGNOSIS — R53.83 FATIGUE, UNSPECIFIED TYPE: ICD-10-CM

## 2023-12-20 DIAGNOSIS — R63.5 WEIGHT GAIN: ICD-10-CM

## 2023-12-20 LAB
CLUE CELLS: ABNORMAL
HGB BLD-MCNC: 12.9 G/DL (ref 11.7–15.7)
TRICHOMONAS, WET PREP: ABNORMAL
WBC'S/HIGH POWER FIELD, WET PREP: ABNORMAL
YEAST, WET PREP: PRESENT

## 2023-12-20 PROCEDURE — 99395 PREV VISIT EST AGE 18-39: CPT | Mod: 25 | Performed by: FAMILY MEDICINE

## 2023-12-20 PROCEDURE — 83550 IRON BINDING TEST: CPT | Performed by: FAMILY MEDICINE

## 2023-12-20 PROCEDURE — G0145 SCR C/V CYTO,THINLAYER,RESCR: HCPCS | Performed by: FAMILY MEDICINE

## 2023-12-20 PROCEDURE — 90471 IMMUNIZATION ADMIN: CPT | Performed by: FAMILY MEDICINE

## 2023-12-20 PROCEDURE — 85018 HEMOGLOBIN: CPT | Performed by: FAMILY MEDICINE

## 2023-12-20 PROCEDURE — 84443 ASSAY THYROID STIM HORMONE: CPT | Performed by: FAMILY MEDICINE

## 2023-12-20 PROCEDURE — 90651 9VHPV VACCINE 2/3 DOSE IM: CPT | Performed by: FAMILY MEDICINE

## 2023-12-20 PROCEDURE — 83540 ASSAY OF IRON: CPT | Performed by: FAMILY MEDICINE

## 2023-12-20 PROCEDURE — 36415 COLL VENOUS BLD VENIPUNCTURE: CPT | Performed by: FAMILY MEDICINE

## 2023-12-20 PROCEDURE — 99213 OFFICE O/P EST LOW 20 MIN: CPT | Mod: 25 | Performed by: FAMILY MEDICINE

## 2023-12-20 PROCEDURE — 87210 SMEAR WET MOUNT SALINE/INK: CPT | Performed by: FAMILY MEDICINE

## 2023-12-20 RX ORDER — FLUCONAZOLE 150 MG/1
150 TABLET ORAL ONCE
Qty: 1 TABLET | Refills: 0 | Status: SHIPPED | OUTPATIENT
Start: 2023-12-20 | End: 2023-12-20

## 2023-12-20 ASSESSMENT — PAIN SCALES - GENERAL: PAINLEVEL: NO PAIN (0)

## 2023-12-20 NOTE — PATIENT INSTRUCTIONS
HPV vaccine schedule:  Three doses of HPV vaccine should be given at 0, 1 to 2 (typically 2), and 6 months.  The minimum intervals between the first two doses is four weeks, between the second and third doses is 12 weeks, and between the first and third dose is five months.

## 2023-12-21 LAB
IRON BINDING CAPACITY (ROCHE): 296 UG/DL (ref 240–430)
IRON SATN MFR SERPL: 34 % (ref 15–46)
IRON SERPL-MCNC: 101 UG/DL (ref 37–145)
TSH SERPL DL<=0.005 MIU/L-ACNC: 2.51 UIU/ML (ref 0.3–4.2)

## 2023-12-26 LAB
BKR LAB AP GYN ADEQUACY: NORMAL
BKR LAB AP GYN INTERPRETATION: NORMAL
BKR LAB AP HPV REFLEX: NORMAL
BKR LAB AP PREVIOUS ABNORMAL: NORMAL
PATH REPORT.COMMENTS IMP SPEC: NORMAL
PATH REPORT.COMMENTS IMP SPEC: NORMAL
PATH REPORT.RELEVANT HX SPEC: NORMAL

## 2024-01-25 ENCOUNTER — ALLIED HEALTH/NURSE VISIT (OUTPATIENT)
Dept: FAMILY MEDICINE | Facility: CLINIC | Age: 29
End: 2024-01-25
Payer: COMMERCIAL

## 2024-01-25 DIAGNOSIS — Z23 ENCOUNTER FOR IMMUNIZATION: Primary | ICD-10-CM

## 2024-01-25 PROCEDURE — 90471 IMMUNIZATION ADMIN: CPT

## 2024-01-25 PROCEDURE — 99207 PR NO CHARGE NURSE ONLY: CPT

## 2024-01-25 PROCEDURE — 90651 9VHPV VACCINE 2/3 DOSE IM: CPT

## 2024-01-25 NOTE — PROGRESS NOTES
Prior to immunization administration, verified patients identity using patient s name and date of birth. Please see Immunization Activity for additional information.     Screening Questionnaire for Adult Immunization    Are you sick today?   No   Do you have allergies to medications, food, a vaccine component or latex?   Yes   Have you ever had a serious reaction after receiving a vaccination?   No   Do you have a long-term health problem with heart, lung, kidney, or metabolic disease (e.g., diabetes), asthma, a blood disorder, no spleen, complement component deficiency, a cochlear implant, or a spinal fluid leak?  Are you on long-term aspirin therapy?   No   Do you have cancer, leukemia, HIV/AIDS, or any other immune system problem?   No   Do you have a parent, brother, or sister with an immune system problem?   No   In the past 3 months, have you taken medications that affect  your immune system, such as prednisone, other steroids, or anticancer drugs; drugs for the treatment of rheumatoid arthritis, Crohn s disease, or psoriasis; or have you had radiation treatments?   No   Have you had a seizure, or a brain or other nervous system problem?   No   During the past year, have you received a transfusion of blood or blood    products, or been given immune (gamma) globulin or antiviral drug?   No   For women: Are you pregnant or is there a chance you could become       pregnant during the next month?   No   Have you received any vaccinations in the past 4 weeks?   No     Immunization questionnaire was positive for at least one answer.  Notified [facility].    I have reviewed the following standing orders:   This patient is due and qualifies for the HPV vaccine.    Click here for HPV (Adult 15-45Y) Standing Order     I have reviewed the vaccines inclusion and exclusion criteria;No concerns regarding eligibility.       Patient instructed to remain in clinic for 15 minutes afterwards, and to report any adverse reactions.      Screening performed by Meeta Bourne MA on 1/25/2024 at 2:22 PM.

## 2024-02-13 DIAGNOSIS — G43.109 MIGRAINE WITH AURA AND WITHOUT STATUS MIGRAINOSUS, NOT INTRACTABLE: ICD-10-CM

## 2024-05-15 DIAGNOSIS — G43.109 MIGRAINE WITH AURA AND WITHOUT STATUS MIGRAINOSUS, NOT INTRACTABLE: ICD-10-CM

## 2024-08-06 ENCOUNTER — MYC MEDICAL ADVICE (OUTPATIENT)
Dept: FAMILY MEDICINE | Facility: CLINIC | Age: 29
End: 2024-08-06
Payer: COMMERCIAL

## 2024-08-06 DIAGNOSIS — Z11.1 TUBERCULOSIS SCREENING: Primary | ICD-10-CM

## 2024-08-27 ENCOUNTER — LAB (OUTPATIENT)
Dept: LAB | Facility: CLINIC | Age: 29
End: 2024-08-27
Payer: COMMERCIAL

## 2024-08-27 ENCOUNTER — ALLIED HEALTH/NURSE VISIT (OUTPATIENT)
Dept: FAMILY MEDICINE | Facility: CLINIC | Age: 29
End: 2024-08-27
Payer: COMMERCIAL

## 2024-08-27 DIAGNOSIS — Z23 ENCOUNTER FOR IMMUNIZATION: Primary | ICD-10-CM

## 2024-08-27 DIAGNOSIS — Z11.4 SCREENING FOR HIV (HUMAN IMMUNODEFICIENCY VIRUS): ICD-10-CM

## 2024-08-27 DIAGNOSIS — Z11.1 TUBERCULOSIS SCREENING: ICD-10-CM

## 2024-08-27 DIAGNOSIS — Z11.59 NEED FOR HEPATITIS C SCREENING TEST: ICD-10-CM

## 2024-08-27 LAB — HIV 1+2 AB+HIV1 P24 AG SERPL QL IA: NONREACTIVE

## 2024-08-27 PROCEDURE — 36415 COLL VENOUS BLD VENIPUNCTURE: CPT

## 2024-08-27 PROCEDURE — 86803 HEPATITIS C AB TEST: CPT

## 2024-08-27 PROCEDURE — 99207 PR NO CHARGE NURSE ONLY: CPT

## 2024-08-27 PROCEDURE — 90651 9VHPV VACCINE 2/3 DOSE IM: CPT

## 2024-08-27 PROCEDURE — 90471 IMMUNIZATION ADMIN: CPT

## 2024-08-27 PROCEDURE — 86481 TB AG RESPONSE T-CELL SUSP: CPT

## 2024-08-27 PROCEDURE — 87389 HIV-1 AG W/HIV-1&-2 AB AG IA: CPT

## 2024-08-27 NOTE — PROGRESS NOTES
Prior to immunization administration, verified patients identity using patient s name and date of birth. Please see Immunization Activity for additional information.     Screening Questionnaire for Adult Immunization    Are you sick today?   No   Do you have allergies to medications, food, a vaccine component or latex?   No   Have you ever had a serious reaction after receiving a vaccination?   No   Do you have a long-term health problem with heart, lung, kidney, or metabolic disease (e.g., diabetes), asthma, a blood disorder, no spleen, complement component deficiency, a cochlear implant, or a spinal fluid leak?  Are you on long-term aspirin therapy?   No   Do you have cancer, leukemia, HIV/AIDS, or any other immune system problem?   No   Do you have a parent, brother, or sister with an immune system problem?   No   In the past 3 months, have you taken medications that affect  your immune system, such as prednisone, other steroids, or anticancer drugs; drugs for the treatment of rheumatoid arthritis, Crohn s disease, or psoriasis; or have you had radiation treatments?   No   Have you had a seizure, or a brain or other nervous system problem?   No   During the past year, have you received a transfusion of blood or blood    products, or been given immune (gamma) globulin or antiviral drug?   No   For women: Are you pregnant or is there a chance you could become       pregnant during the next month?   No   Have you received any vaccinations in the past 4 weeks?   No     Immunization questionnaire answers were all negative.    I have reviewed the following standing orders:   This patient is due and qualifies for the HPV vaccine.    Click here for HPV (Adult 15-45Y) Standing Order     I have reviewed the vaccines inclusion and exclusion criteria;No concerns regarding eligibility.       Patient instructed to remain in clinic for 15 minutes afterwards, and to report any adverse reactions.     Screening performed by Elicia  ERENDIRA Gustafson MA on 8/27/2024 at 1:20 PM.

## 2024-08-28 LAB — HCV AB SERPL QL IA: NONREACTIVE

## 2024-08-29 LAB
GAMMA INTERFERON BACKGROUND BLD IA-ACNC: 0 IU/ML
M TB IFN-G BLD-IMP: NEGATIVE
M TB IFN-G CD4+ BCKGRND COR BLD-ACNC: 10 IU/ML
MITOGEN IGNF BCKGRD COR BLD-ACNC: 0.01 IU/ML
MITOGEN IGNF BCKGRD COR BLD-ACNC: 0.01 IU/ML
QUANTIFERON MITOGEN: 10 IU/ML
QUANTIFERON NIL TUBE: 0 IU/ML
QUANTIFERON TB1 TUBE: 0.01 IU/ML
QUANTIFERON TB2 TUBE: 0.01

## 2024-11-09 DIAGNOSIS — G43.109 MIGRAINE WITH AURA AND WITHOUT STATUS MIGRAINOSUS, NOT INTRACTABLE: ICD-10-CM

## 2024-11-20 ENCOUNTER — PATIENT OUTREACH (OUTPATIENT)
Dept: CARE COORDINATION | Facility: CLINIC | Age: 29
End: 2024-11-20
Payer: COMMERCIAL

## 2024-12-04 ENCOUNTER — PATIENT OUTREACH (OUTPATIENT)
Dept: CARE COORDINATION | Facility: CLINIC | Age: 29
End: 2024-12-04
Payer: COMMERCIAL

## 2025-02-04 DIAGNOSIS — G43.109 MIGRAINE WITH AURA AND WITHOUT STATUS MIGRAINOSUS, NOT INTRACTABLE: ICD-10-CM

## 2025-02-04 NOTE — TELEPHONE ENCOUNTER
Called and patient is scheduled for Physical 04/10/2025.     Patient was wondering If Dr. Tracey can fill her medication until she comes in to be seen. Patient states that she will come to the appointment because she wanted to talk to Dr. Tracey about stopping medication.

## 2025-04-07 NOTE — PROGRESS NOTES
Assessment/Plan:   Jazmin is a 29 year old female here for physical     Routine adult health maintenance  Physical completed today.  No bloodwork indicated.  Up-to-date with Pap smear.  Up-to-date with immunizations.    Migraine with aura and without status migrainosus, not intractable  Overall doing well, patient is interested in trying to go off amitriptyline, we discussed how to do this.  Refilled Imitrex as needed for migraines.  We discussed if migraines increase when she gets off amitriptyline we would consider restarting as needed.  - SUMAtriptan (IMITREX) 50 MG tablet  Dispense: 12 tablet; Refill: 3       I have had an Advance Directives discussion with the patient.  The following high BMI interventions were performed this visit: encouragement to exercise and lifestyle education regarding diet    Follow up: 1 year for physical, sooner as needed    Ilda Tracey MD  [facility]      Subjective:     Jazmin Rocha is a 29 year old female who presents for an annual exam.     Question 4/9/2025 10:07 PM CDT - Filed by Patient   In general, how would you rate your overall physical health? Good   Do you get at least 3 servings of foods that have calcium each day (dairy, green leafy vegetables, etc)? Yes   Do you have a special diet? Regular (no restrictions)   How many servings of fruits and vegetables do you eat daily? (!) 2-3   On average, how many sweetened beverages do you drink each day (Examples: soda, juice, sweet tea, etc.)? Do NOT count diet or artificially sweetened beverages. 0-1   On average, how many days per week do you engage in moderate to strenuous exercise (like a brisk walk)? 3 days   On average, how many minutes do you engage in exercise at this level? 30 min   How often do you get together with friends or relatives? More than three times a week   Do you see a dentist two times every year? Yes   Do you feel stress - tense, restless, nervous, or anxious, or unable to sleep at  night because your mind is troubled all the time - these days? Not at all   If you drink alcohol, do you typically have more than 3 drinks per day OR more than 7 drinks per week? No   Do you use any substances not prescribed by a provider, out of habit or for other reasons? No   Have you had any new sexual partners since you were last tested for sexually transmitted infections, including HIV? No   Do you have any questions about contraception (birth control) or family planning? No   Within the past 12 months, did you worry that your food would run out before you got money to buy more? No   Within the past 12 months, did the food you bought just not last and you didn t have money to get more? No   Do you have housing? (Housing is defined as stable permanent housing and does not include staying ouside in a car, in a tent, in an abandoned building, in an overnight shelter, or couch-surfing.) Yes   Are you worried about losing your housing? No   Within the past 12 months, has lack of transportation kept you from medical appointments, getting your medicines, non-medical meetings or appointments, work, or from getting things that you need? No   Within the past 12 months, have you or your family members you live with been unable to get utilities (heat, electricity) when it was really needed? No     Question 4/9/2025 10:07 PM CDT - Filed by Patient   The following questionnaire should only be answered by the patient. Are you the patient? Yes   Over the last 2 weeks, how often have you been bothered by any of the following problems?    Q1: Little interest or pleasure in doing things Not at all   Q2: Feeling down, depressed or hopeless Not at all   PHQ2 (   1999 [employer],ALL RIGHTS RESERVED. USED WITH PERMISSION. DEVELOPED BY SADA BURGER,[employer],TOMMIE LYON AND [employer],WITH AN EDUCATIONAL RENETTA FROM [employer]    PHQ-2 Score (range: 0 - 6) 0     Overall doing well  -wondering about getting  trying to get off amitriptyline - still having migraines but feels like they are overall better    -thinking of starting inositol for PCOS/weight loss  -has wisdom teeth out recently  -intermittent R breast pain, not necessarily coordinated with cycle - fam hx breast cancer, no lump - same spot, 2 years    Health Maintenance reviewed:  Lipid Profile: no  Glucose Screen: no  Colonoscopy: no  Mammogram: no    Gynecologic History  No LMP recorded (lmp unknown). (Menstrual status: IUD).  Contraception: IUD Mirena  Last Pap: 2023. Results were: nml - due in 3 yrs    Immunization History   Administered Date(s) Administered    COVID-19 12+ (MODERNA) 10/04/2023    COVID-19 12+ (Pfizer) 10/16/2024    COVID-19 Monovalent 12+ (Pfizer 2022) 07/14/2022    COVID-19 Monovalent 18+ (Moderna) 02/03/2021, 03/03/2021    DTAP (<7y) 1997, 1997, 01/13/1998, 10/08/1998, 05/09/2002    HEPA 06/29/2009, 08/21/2012    HIB (PRP-T) 1997, 1997, 01/13/1998, 10/08/1998    HPV9 (Gardasil) 12/20/2023, 01/25/2024, 08/27/2024    HepB 1997, 1997, 01/13/1998    INFLUENZA,TRIVALENT (FLUCELVAX) 09/06/2024    Influenza Vaccine >6 months,quad, PF 10/03/2022, 10/04/2023    Influenza Vaccine, 6+MO IM (QUADRIVALENT W/PRESERVATIVES) 10/05/2020    Influenza,INJ,MDCK,PF,Quad >6mo(Flucelvax) 10/04/2023    MMR (MMRII) 10/08/1998, 09/27/2000    Meningococcal ACWY (Menactra ) 12/09/2014, 08/05/2015    Poliovirus, inactivated (IPV) 1997, 1997, 10/08/1998, 05/09/2002    TD,PF 7+ (Tenivac) 10/07/2020    TDAP Vaccine (Boostrix) 06/29/2009    Typhoid IM 02/13/2017    Varicella (Varivax) 09/27/2000, 06/29/2009     Immunization status: up to date and documented.    Current Outpatient Medications   Medication Sig Dispense Refill    levonorgestrel (MIRENA) 20 MCG/24HR IUD 1 each by Intrauterine route continuous      SUMAtriptan (IMITREX) 50 MG tablet Take 1 tablet (50 mg) by mouth at onset of headache for migraine. May repeat in  2 hours. Max 8 tablets/24 hours. 12 tablet 3     Past Medical History:   Diagnosis Date    Migraine      Past Surgical History:   Procedure Laterality Date    wisdom teeth       Azithromycin, Other environmental allergy, and Zithromax z-miley [azithromycin]  Family History   Problem Relation Age of Onset    Neurologic Disorder Mother         migraine headaches in mother and maternal relatives    Asthma Father         exercise induced asthma    Cancer Maternal Aunt         uterine - age 40's    Hypertension Maternal Aunt     Lipids Maternal Grandfather     Diabetes Maternal Grandmother         type 2    Breast Cancer Paternal Grandmother     Hypertension Paternal Grandmother     Gastrointestinal Disease Paternal Grandmother         IBS (irritable bowel sydrome)    Gynecology Mother         kidney stones (calcium)    Hypertension Mother     Hypotension Father     No Known Problems Sister     Hypertension Maternal Grandmother     Hypertension Maternal Grandfather     No Known Problems Paternal Grandfather      Social History     Socioeconomic History    Marital status:      Spouse name: Not on file    Number of children: Not on file    Years of education: Not on file    Highest education level: Not on file   Occupational History    Not on file   Tobacco Use    Smoking status: Never    Smokeless tobacco: Never   Substance and Sexual Activity    Alcohol use: Yes    Drug use: No    Sexual activity: Yes     Partners: Male     Birth control/protection: I.U.D.   Other Topics Concern    Not on file   Social History Narrative    Not on file     Social Drivers of Health     Financial Resource Strain: Low Risk  (4/9/2025)    Financial Resource Strain     Within the past 12 months, have you or your family members you live with been unable to get utilities (heat, electricity) when it was really needed?: No   Food Insecurity: Low Risk  (4/9/2025)    Food Insecurity     Within the past 12 months, did you worry that your food  would run out before you got money to buy more?: No     Within the past 12 months, did the food you bought just not last and you didn t have money to get more?: No   Transportation Needs: Low Risk  (4/9/2025)    Transportation Needs     Within the past 12 months, has lack of transportation kept you from medical appointments, getting your medicines, non-medical meetings or appointments, work, or from getting things that you need?: No   Physical Activity: Insufficiently Active (4/9/2025)    Exercise Vital Sign     Days of Exercise per Week: 3 days     Minutes of Exercise per Session: 30 min   Stress: No Stress Concern Present (4/9/2025)    [place] [employer] - Occupational Stress Questionnaire     Feeling of Stress : Not at all   Social Connections: Unknown (4/9/2025)    Social Connection and Isolation Panel [NHANES]     Frequency of Communication with Friends and Family: Not on file     Frequency of Social Gatherings with Friends and Family: More than three times a week     Attends Jain Services: Not on file     Active Member of Clubs or Organizations: Not on file     Attends Club or Organization Meetings: Not on file     Marital Status: Not on file   Interpersonal Safety: Low Risk  (12/20/2023)    Interpersonal Safety     Do you feel physically and emotionally safe where you currently live?: Yes     Within the past 12 months, have you been hit, slapped, kicked or otherwise physically hurt by someone?: No     Within the past 12 months, have you been humiliated or emotionally abused in other ways by your partner or ex-partner?: No   Housing Stability: Low Risk  (4/9/2025)    Housing Stability     Do you have housing? : Yes     Are you worried about losing your housing?: No       Review of Systems negative unless noted    Objective:        Vitals:    04/10/25 0931   BP: 119/77   Pulse: 102   Resp: 18   Temp: 97.6  F (36.4  C)   TempSrc: Temporal   SpO2: 100%   Weight: 93.9 kg (207 lb)  "  Height: 1.64 m (5' 4.57\")     Body mass index is 34.91 kg/m .    Physical Exam:  General Appearance: Alert, pleasant, appears stated age  Head: Normocephalic, without obvious abnormality  Eyes: PERRL, conjunctiva/corneas clear, EOM's intact  Ears: Normal TM's and external ear canals, both ears  Nose: Nares normal, septum midline,mucosa normal, no drainage  Throat: Lips, mucosa, and tongue normal; teeth and gums normal; oropharynx is clear  Neck: Supple,without lymphadenopathy, no thyromegaly or nodules noted  Lungs: Clear to auscultation bilaterally, respirations unlabored, no wheezing or crackles  Heart: Regular rate and rhythm, no murmur   Abdomen: Soft, non-tender, no masses, no organomegaly  Extremities: Extremities with strong and symmetric pulses, no cyanosis or edema  Skin: Skin color, texture normal, no rashes or lesions  Neurologic: Grossly normal, no focal deficits      "

## 2025-04-09 SDOH — HEALTH STABILITY: PHYSICAL HEALTH: ON AVERAGE, HOW MANY MINUTES DO YOU ENGAGE IN EXERCISE AT THIS LEVEL?: 30 MIN

## 2025-04-09 SDOH — HEALTH STABILITY: PHYSICAL HEALTH: ON AVERAGE, HOW MANY DAYS PER WEEK DO YOU ENGAGE IN MODERATE TO STRENUOUS EXERCISE (LIKE A BRISK WALK)?: 3 DAYS

## 2025-04-09 ASSESSMENT — SOCIAL DETERMINANTS OF HEALTH (SDOH): HOW OFTEN DO YOU GET TOGETHER WITH FRIENDS OR RELATIVES?: MORE THAN THREE TIMES A WEEK

## 2025-04-10 ENCOUNTER — OFFICE VISIT (OUTPATIENT)
Dept: FAMILY MEDICINE | Facility: CLINIC | Age: 30
End: 2025-04-10
Payer: COMMERCIAL

## 2025-04-10 VITALS
HEIGHT: 65 IN | TEMPERATURE: 97.6 F | HEART RATE: 102 BPM | SYSTOLIC BLOOD PRESSURE: 119 MMHG | OXYGEN SATURATION: 100 % | WEIGHT: 207 LBS | BODY MASS INDEX: 34.49 KG/M2 | RESPIRATION RATE: 18 BRPM | DIASTOLIC BLOOD PRESSURE: 77 MMHG

## 2025-04-10 DIAGNOSIS — G43.109 MIGRAINE WITH AURA AND WITHOUT STATUS MIGRAINOSUS, NOT INTRACTABLE: ICD-10-CM

## 2025-04-10 DIAGNOSIS — Z00.00 ROUTINE ADULT HEALTH MAINTENANCE: Primary | ICD-10-CM

## 2025-04-10 RX ORDER — SUMATRIPTAN 50 MG/1
50 TABLET, FILM COATED ORAL
Qty: 12 TABLET | Refills: 3 | Status: SHIPPED | OUTPATIENT
Start: 2025-04-10

## 2025-06-09 ENCOUNTER — MYC MEDICAL ADVICE (OUTPATIENT)
Dept: FAMILY MEDICINE | Facility: CLINIC | Age: 30
End: 2025-06-09
Payer: COMMERCIAL

## 2025-06-09 DIAGNOSIS — Z23 ENCOUNTER FOR VACCINATION: Primary | ICD-10-CM
